# Patient Record
Sex: FEMALE | Race: WHITE | Employment: OTHER | URBAN - METROPOLITAN AREA
[De-identification: names, ages, dates, MRNs, and addresses within clinical notes are randomized per-mention and may not be internally consistent; named-entity substitution may affect disease eponyms.]

---

## 2017-03-22 ENCOUNTER — APPOINTMENT (OUTPATIENT)
Dept: CT IMAGING | Facility: HOSPITAL | Age: 67
End: 2017-03-22
Attending: EMERGENCY MEDICINE
Payer: MEDICARE

## 2017-03-22 ENCOUNTER — HOSPITAL ENCOUNTER (EMERGENCY)
Facility: HOSPITAL | Age: 67
Discharge: HOME | End: 2017-03-22
Attending: EMERGENCY MEDICINE
Payer: MEDICARE

## 2017-03-22 VITALS
RESPIRATION RATE: 16 BRPM | OXYGEN SATURATION: 96 % | DIASTOLIC BLOOD PRESSURE: 67 MMHG | HEART RATE: 74 BPM | WEIGHT: 195 LBS | TEMPERATURE: 98 F | SYSTOLIC BLOOD PRESSURE: 177 MMHG | BODY MASS INDEX: 35.88 KG/M2 | HEIGHT: 62 IN

## 2017-03-22 DIAGNOSIS — R73.9 HYPERGLYCEMIA: ICD-10-CM

## 2017-03-22 DIAGNOSIS — E86.0 DEHYDRATION: ICD-10-CM

## 2017-03-22 DIAGNOSIS — R19.7 DIARRHEA, UNSPECIFIED TYPE: Primary | ICD-10-CM

## 2017-03-22 LAB
ALBUMIN SERPL-MCNC: 3.8 G/DL (ref 3.5–4.8)
ALP LIVER SERPL-CCNC: 60 U/L (ref 55–142)
ALT SERPL-CCNC: 25 U/L (ref 14–54)
AST SERPL-CCNC: 19 U/L (ref 15–41)
BASOPHILS # BLD AUTO: 0.05 X10(3) UL (ref 0–0.1)
BASOPHILS NFR BLD AUTO: 0.4 %
BILIRUB SERPL-MCNC: 0.5 MG/DL (ref 0.1–2)
BILIRUB UR QL STRIP.AUTO: NEGATIVE
BUN BLD-MCNC: 26 MG/DL (ref 8–20)
CALCIUM BLD-MCNC: 9.5 MG/DL (ref 8.3–10.3)
CHLORIDE: 101 MMOL/L (ref 101–111)
CLARITY UR REFRACT.AUTO: CLEAR
CO2: 23 MMOL/L (ref 22–32)
COLOR UR AUTO: YELLOW
CREAT BLD-MCNC: 1.29 MG/DL (ref 0.55–1.02)
EOSINOPHIL # BLD AUTO: 0.33 X10(3) UL (ref 0–0.3)
EOSINOPHIL NFR BLD AUTO: 2.8 %
ERYTHROCYTE [DISTWIDTH] IN BLOOD BY AUTOMATED COUNT: 13.2 % (ref 11.5–16)
GLUCOSE BLD-MCNC: 255 MG/DL (ref 70–99)
GLUCOSE UR STRIP.AUTO-MCNC: 150 MG/DL
HCT VFR BLD AUTO: 36.6 % (ref 34–50)
HGB BLD-MCNC: 12.4 G/DL (ref 12–16)
IMMATURE GRANULOCYTE COUNT: 0.04 X10(3) UL (ref 0–1)
IMMATURE GRANULOCYTE RATIO %: 0.3 %
KETONES UR STRIP.AUTO-MCNC: NEGATIVE MG/DL
LEUKOCYTE ESTERASE UR QL STRIP.AUTO: NEGATIVE
LIPASE: 275 U/L (ref 73–393)
LYMPHOCYTES # BLD AUTO: 1.55 X10(3) UL (ref 0.9–4)
LYMPHOCYTES NFR BLD AUTO: 13 %
M PROTEIN MFR SERPL ELPH: 7.5 G/DL (ref 6.1–8.3)
MCH RBC QN AUTO: 29.3 PG (ref 27–33.2)
MCHC RBC AUTO-ENTMCNC: 33.9 G/DL (ref 31–37)
MCV RBC AUTO: 86.5 FL (ref 81–100)
MONOCYTES # BLD AUTO: 0.8 X10(3) UL (ref 0.1–0.6)
MONOCYTES NFR BLD AUTO: 6.7 %
NEUTROPHIL ABS PRELIM: 9.17 X10 (3) UL (ref 1.3–6.7)
NEUTROPHILS # BLD AUTO: 9.17 X10(3) UL (ref 1.3–6.7)
NEUTROPHILS NFR BLD AUTO: 76.8 %
NITRITE UR QL STRIP.AUTO: NEGATIVE
PH UR STRIP.AUTO: 5 [PH] (ref 4.5–8)
PLATELET # BLD AUTO: 226 10(3)UL (ref 150–450)
POTASSIUM SERPL-SCNC: 4.3 MMOL/L (ref 3.6–5.1)
PROT UR STRIP.AUTO-MCNC: 100 MG/DL
RBC # BLD AUTO: 4.23 X10(6)UL (ref 3.8–5.1)
RBC UR QL AUTO: NEGATIVE
RED CELL DISTRIBUTION WIDTH-SD: 41.4 FL (ref 35.1–46.3)
SODIUM SERPL-SCNC: 135 MMOL/L (ref 136–144)
SP GR UR STRIP.AUTO: 1.02 (ref 1–1.03)
UROBILINOGEN UR STRIP.AUTO-MCNC: <2 MG/DL
WBC # BLD AUTO: 11.9 X10(3) UL (ref 4–13)

## 2017-03-22 PROCEDURE — 87269 GIARDIA AG IF: CPT | Performed by: EMERGENCY MEDICINE

## 2017-03-22 PROCEDURE — 82272 OCCULT BLD FECES 1-3 TESTS: CPT | Performed by: EMERGENCY MEDICINE

## 2017-03-22 PROCEDURE — 87209 SMEAR COMPLEX STAIN: CPT | Performed by: EMERGENCY MEDICINE

## 2017-03-22 PROCEDURE — 87493 C DIFF AMPLIFIED PROBE: CPT | Performed by: EMERGENCY MEDICINE

## 2017-03-22 PROCEDURE — 96361 HYDRATE IV INFUSION ADD-ON: CPT

## 2017-03-22 PROCEDURE — 74176 CT ABD & PELVIS W/O CONTRAST: CPT

## 2017-03-22 PROCEDURE — 83690 ASSAY OF LIPASE: CPT | Performed by: EMERGENCY MEDICINE

## 2017-03-22 PROCEDURE — 80053 COMPREHEN METABOLIC PANEL: CPT | Performed by: EMERGENCY MEDICINE

## 2017-03-22 PROCEDURE — 87177 OVA AND PARASITES SMEARS: CPT | Performed by: EMERGENCY MEDICINE

## 2017-03-22 PROCEDURE — 99284 EMERGENCY DEPT VISIT MOD MDM: CPT

## 2017-03-22 PROCEDURE — 87427 SHIGA-LIKE TOXIN AG IA: CPT | Performed by: EMERGENCY MEDICINE

## 2017-03-22 PROCEDURE — 87086 URINE CULTURE/COLONY COUNT: CPT | Performed by: EMERGENCY MEDICINE

## 2017-03-22 PROCEDURE — 87425 ROTAVIRUS AG IA: CPT | Performed by: EMERGENCY MEDICINE

## 2017-03-22 PROCEDURE — 87046 STOOL CULTR AEROBIC BACT EA: CPT | Performed by: EMERGENCY MEDICINE

## 2017-03-22 PROCEDURE — 85025 COMPLETE CBC W/AUTO DIFF WBC: CPT | Performed by: EMERGENCY MEDICINE

## 2017-03-22 PROCEDURE — 87045 FECES CULTURE AEROBIC BACT: CPT | Performed by: EMERGENCY MEDICINE

## 2017-03-22 PROCEDURE — 81001 URINALYSIS AUTO W/SCOPE: CPT | Performed by: EMERGENCY MEDICINE

## 2017-03-22 PROCEDURE — 87015 SPECIMEN INFECT AGNT CONCNTJ: CPT | Performed by: EMERGENCY MEDICINE

## 2017-03-22 PROCEDURE — 96374 THER/PROPH/DIAG INJ IV PUSH: CPT

## 2017-03-22 RX ORDER — ONDANSETRON 2 MG/ML
4 INJECTION INTRAMUSCULAR; INTRAVENOUS ONCE
Status: COMPLETED | OUTPATIENT
Start: 2017-03-22 | End: 2017-03-22

## 2017-03-22 NOTE — ED INITIAL ASSESSMENT (HPI)
Pt with c/o diarrhea for past 3 days. Pt c/o nausea. Denies vomiting. Started Sunday night. Denies fevers.   Pt sent by pmd

## 2017-03-22 NOTE — ED PROVIDER NOTES
Patient Seen in: [facility] Emergency Department    History   Patient presents with:  Nausea/Vomiting/Diarrhea (gastrointestinal)    Stated Complaint: diarrhea    HPI    44-year-old female with history diabetes mellitus, hypertension, high cholester nightly. Choline Fenofibrate (FENOFIBRIC ACID) 135 MG Oral Capsule Delayed Release,  Take 1 capsule by mouth once daily. ezetimibe (ZETIA) 10 MG Oral Tab,  Take 1 tablet (10 mg total) by mouth once daily.    Lisinopril-Hydrochlorothiazide 20-12.5 MG Ora (36.7 °C) (Temporal)  Resp 17  Ht 157.5 cm (5' 2\")  Wt 88.451 kg  BMI 35.66 kg/m2  SpO2 96%        Physical Exam    GENERAL: Patient is awake, alert, well-appearing, in no acute distress. HEENT:  no scleral icterus.   Mucous membranes are slightly dry, or ---------                               -----------         ------                     CBC W/ DIFFERENTIAL[[id number]]          Abnormal            Final result                 Please view results for these tests on the individual orders.    ST Patient return if any change worsening symptoms. Mucous members are moist on reevaluation and patient clinically hydrated. Patient is well-appearing and was discharged good condition.       Disposition and Plan     Clinical Impression:  Diarrhea, unspecif

## 2017-03-23 ENCOUNTER — LAB ENCOUNTER (OUTPATIENT)
Dept: LAB | Facility: HOSPITAL | Age: 67
End: 2017-03-23
Attending: EMERGENCY MEDICINE
Payer: MEDICARE

## 2017-03-23 DIAGNOSIS — E86.0 DEHYDRATION: ICD-10-CM

## 2017-03-23 DIAGNOSIS — R19.7 DIARRHEA: Primary | ICD-10-CM

## 2017-03-23 PROCEDURE — 87046 STOOL CULTR AEROBIC BACT EA: CPT

## 2017-03-23 PROCEDURE — 87177 OVA AND PARASITES SMEARS: CPT

## 2017-03-23 PROCEDURE — 87015 SPECIMEN INFECT AGNT CONCNTJ: CPT

## 2017-03-23 PROCEDURE — 87045 FECES CULTURE AEROBIC BACT: CPT

## 2017-03-23 PROCEDURE — 87209 SMEAR COMPLEX STAIN: CPT

## 2017-03-23 PROCEDURE — 87269 GIARDIA AG IF: CPT

## 2017-03-23 PROCEDURE — 87427 SHIGA-LIKE TOXIN AG IA: CPT

## 2017-03-27 LAB
OVA AND PARASITE, TRICHROME STAIN: NEGATIVE
OVA AND PARASITE, WET MOUNT: NEGATIVE

## 2018-08-27 ENCOUNTER — APPOINTMENT (OUTPATIENT)
Dept: GENERAL RADIOLOGY | Facility: HOSPITAL | Age: 68
End: 2018-08-27
Payer: MEDICARE

## 2018-08-27 ENCOUNTER — APPOINTMENT (OUTPATIENT)
Dept: NUCLEAR MEDICINE | Facility: HOSPITAL | Age: 68
End: 2018-08-27
Attending: EMERGENCY MEDICINE
Payer: MEDICARE

## 2018-08-27 ENCOUNTER — HOSPITAL ENCOUNTER (OUTPATIENT)
Facility: HOSPITAL | Age: 68
Setting detail: OBSERVATION
Discharge: HOME | End: 2018-08-28
Attending: EMERGENCY MEDICINE | Admitting: HOSPITALIST
Payer: MEDICARE

## 2018-08-27 ENCOUNTER — APPOINTMENT (OUTPATIENT)
Dept: CT IMAGING | Facility: HOSPITAL | Age: 68
End: 2018-08-27
Attending: EMERGENCY MEDICINE
Payer: MEDICARE

## 2018-08-27 DIAGNOSIS — E87.6 HYPOKALEMIA: ICD-10-CM

## 2018-08-27 DIAGNOSIS — R07.9 CHEST PAIN OF UNCERTAIN ETIOLOGY: Primary | ICD-10-CM

## 2018-08-27 DIAGNOSIS — E11.9 TYPE 2 DIABETES MELLITUS WITHOUT COMPLICATION, WITH LONG-TERM CURRENT USE OF INSULIN (HCC): ICD-10-CM

## 2018-08-27 DIAGNOSIS — R77.8 ELEVATED TROPONIN: ICD-10-CM

## 2018-08-27 DIAGNOSIS — Z79.4 TYPE 2 DIABETES MELLITUS WITHOUT COMPLICATION, WITH LONG-TERM CURRENT USE OF INSULIN (HCC): ICD-10-CM

## 2018-08-27 LAB
ALBUMIN SERPL-MCNC: 3.6 G/DL (ref 3.5–4.8)
ALBUMIN/GLOB SERPL: 1.1 {RATIO} (ref 1–2)
ALP LIVER SERPL-CCNC: 59 U/L (ref 55–142)
ALT SERPL-CCNC: 32 U/L (ref 14–54)
ANION GAP SERPL CALC-SCNC: 10 MMOL/L (ref 0–18)
AST SERPL-CCNC: 22 U/L (ref 15–41)
ATRIAL RATE: 65 BPM
BASOPHILS # BLD AUTO: 0.06 X10(3) UL (ref 0–0.1)
BASOPHILS NFR BLD AUTO: 0.6 %
BILIRUB SERPL-MCNC: 0.3 MG/DL (ref 0.1–2)
BUN BLD-MCNC: 18 MG/DL (ref 8–20)
BUN/CREAT SERPL: 15.8 (ref 10–20)
CALCIUM BLD-MCNC: 10.4 MG/DL (ref 8.3–10.3)
CHLORIDE SERPL-SCNC: 103 MMOL/L (ref 101–111)
CO2 SERPL-SCNC: 27 MMOL/L (ref 22–32)
CREAT BLD-MCNC: 1.14 MG/DL (ref 0.55–1.02)
D-DIMER: 0.82 UG/ML FEU (ref 0–0.49)
EOSINOPHIL # BLD AUTO: 0.42 X10(3) UL (ref 0–0.3)
EOSINOPHIL NFR BLD AUTO: 4.5 %
ERYTHROCYTE [DISTWIDTH] IN BLOOD BY AUTOMATED COUNT: 12.7 % (ref 11.5–16)
GLOBULIN PLAS-MCNC: 3.4 G/DL (ref 2.5–4)
GLUCOSE BLD-MCNC: 156 MG/DL (ref 70–99)
GLUCOSE BLD-MCNC: 79 MG/DL (ref 65–99)
GLUCOSE BLD-MCNC: 90 MG/DL (ref 65–99)
HCT VFR BLD AUTO: 35.8 % (ref 34–50)
HGB BLD-MCNC: 12 G/DL (ref 12–16)
IMMATURE GRANULOCYTE COUNT: 0.03 X10(3) UL (ref 0–1)
IMMATURE GRANULOCYTE RATIO %: 0.3 %
LIPASE: 348 U/L (ref 73–393)
LYMPHOCYTES # BLD AUTO: 2.73 X10(3) UL (ref 0.9–4)
LYMPHOCYTES NFR BLD AUTO: 29 %
M PROTEIN MFR SERPL ELPH: 7 G/DL (ref 6.1–8.3)
MCH RBC QN AUTO: 28.4 PG (ref 27–33.2)
MCHC RBC AUTO-ENTMCNC: 33.5 G/DL (ref 31–37)
MCV RBC AUTO: 84.6 FL (ref 81–100)
MONOCYTES # BLD AUTO: 0.62 X10(3) UL (ref 0.1–1)
MONOCYTES NFR BLD AUTO: 6.6 %
NEUTROPHIL ABS PRELIM: 5.57 X10 (3) UL (ref 1.3–6.7)
NEUTROPHILS # BLD AUTO: 5.57 X10(3) UL (ref 1.3–6.7)
NEUTROPHILS NFR BLD AUTO: 59 %
OSMOLALITY SERPL CALC.SUM OF ELEC: 295 MOSM/KG (ref 275–295)
P AXIS: 37 DEGREES
P-R INTERVAL: 172 MS
PLATELET # BLD AUTO: 245 10(3)UL (ref 150–450)
POTASSIUM SERPL-SCNC: 3.3 MMOL/L (ref 3.6–5.1)
Q-T INTERVAL: 420 MS
QRS DURATION: 90 MS
QTC CALCULATION (BEZET): 436 MS
R AXIS: 27 DEGREES
RBC # BLD AUTO: 4.23 X10(6)UL (ref 3.8–5.1)
RED CELL DISTRIBUTION WIDTH-SD: 39.3 FL (ref 35.1–46.3)
SED RATE-ML: 13 MM/HR (ref 0–25)
SODIUM SERPL-SCNC: 140 MMOL/L (ref 136–144)
T AXIS: 140 DEGREES
TROPONIN I SERPL-MCNC: 0.05 NG/ML (ref ?–0.05)
VENTRICULAR RATE: 65 BPM
WBC # BLD AUTO: 9.4 X10(3) UL (ref 4–13)

## 2018-08-27 PROCEDURE — 71045 X-RAY EXAM CHEST 1 VIEW: CPT | Performed by: EMERGENCY MEDICINE

## 2018-08-27 PROCEDURE — 99220 INITIAL OBSERVATION CARE,LEVL III: CPT | Performed by: HOSPITALIST

## 2018-08-27 PROCEDURE — 74176 CT ABD & PELVIS W/O CONTRAST: CPT | Performed by: EMERGENCY MEDICINE

## 2018-08-27 PROCEDURE — 78582 LUNG VENTILAT&PERFUS IMAGING: CPT | Performed by: EMERGENCY MEDICINE

## 2018-08-27 RX ORDER — MORPHINE SULFATE 4 MG/ML
4 INJECTION, SOLUTION INTRAMUSCULAR; INTRAVENOUS EVERY 30 MIN PRN
Status: DISCONTINUED | OUTPATIENT
Start: 2018-08-27 | End: 2018-08-27

## 2018-08-27 RX ORDER — ASPIRIN 81 MG/1
324 TABLET, CHEWABLE ORAL ONCE
Status: COMPLETED | OUTPATIENT
Start: 2018-08-27 | End: 2018-08-27

## 2018-08-27 RX ORDER — DEXTROSE MONOHYDRATE 25 G/50ML
50 INJECTION, SOLUTION INTRAVENOUS
Status: DISCONTINUED | OUTPATIENT
Start: 2018-08-27 | End: 2018-08-28

## 2018-08-27 RX ORDER — ACETAMINOPHEN 325 MG/1
650 TABLET ORAL EVERY 6 HOURS PRN
Status: DISCONTINUED | OUTPATIENT
Start: 2018-08-27 | End: 2018-08-28

## 2018-08-27 RX ORDER — ENOXAPARIN SODIUM 100 MG/ML
40 INJECTION SUBCUTANEOUS NIGHTLY
Status: DISCONTINUED | OUTPATIENT
Start: 2018-08-27 | End: 2018-08-28

## 2018-08-27 RX ORDER — MULTIVIT WITH MINERALS/LUTEIN
1000 TABLET ORAL DAILY
COMMUNITY
End: 2020-09-17

## 2018-08-27 RX ORDER — ZOLPIDEM TARTRATE 5 MG/1
5 TABLET ORAL NIGHTLY PRN
Status: DISCONTINUED | OUTPATIENT
Start: 2018-08-27 | End: 2018-08-28

## 2018-08-27 RX ORDER — ESCITALOPRAM OXALATE 20 MG/1
30 TABLET ORAL EVERY EVENING
COMMUNITY
End: 2019-01-15

## 2018-08-27 RX ORDER — ASPIRIN 81 MG/1
324 TABLET, CHEWABLE ORAL DAILY
Status: DISCONTINUED | OUTPATIENT
Start: 2018-08-28 | End: 2018-08-28

## 2018-08-27 RX ORDER — TRAZODONE HYDROCHLORIDE 50 MG/1
50 TABLET ORAL NIGHTLY PRN
Status: DISCONTINUED | OUTPATIENT
Start: 2018-08-27 | End: 2018-08-28

## 2018-08-27 RX ORDER — ATORVASTATIN CALCIUM 80 MG/1
80 TABLET, FILM COATED ORAL NIGHTLY
Status: DISCONTINUED | OUTPATIENT
Start: 2018-08-27 | End: 2018-08-28

## 2018-08-27 RX ORDER — EZETIMIBE 10 MG/1
10 TABLET ORAL NIGHTLY
COMMUNITY
End: 2019-07-16

## 2018-08-27 RX ORDER — ASPIRIN 81 MG/1
81 TABLET ORAL DAILY
Status: ON HOLD | COMMUNITY
End: 2020-02-19

## 2018-08-27 RX ORDER — PANTOPRAZOLE SODIUM 40 MG/1
40 TABLET, DELAYED RELEASE ORAL
Status: DISCONTINUED | OUTPATIENT
Start: 2018-08-28 | End: 2018-08-28

## 2018-08-27 RX ORDER — POTASSIUM CHLORIDE 14.9 MG/ML
20 INJECTION INTRAVENOUS ONCE
Status: COMPLETED | OUTPATIENT
Start: 2018-08-27 | End: 2018-08-27

## 2018-08-27 RX ORDER — LISINOPRIL AND HYDROCHLOROTHIAZIDE 20; 12.5 MG/1; MG/1
2 TABLET ORAL
Status: DISCONTINUED | OUTPATIENT
Start: 2018-08-28 | End: 2018-08-27 | Stop reason: SDUPTHER

## 2018-08-27 RX ORDER — POTASSIUM CHLORIDE 20 MEQ/1
20 TABLET, EXTENDED RELEASE ORAL ONCE
Status: COMPLETED | OUTPATIENT
Start: 2018-08-27 | End: 2018-08-27

## 2018-08-27 RX ORDER — FAMOTIDINE 10 MG/ML
20 INJECTION, SOLUTION INTRAVENOUS ONCE
Status: COMPLETED | OUTPATIENT
Start: 2018-08-27 | End: 2018-08-27

## 2018-08-27 RX ORDER — FENOFIBRATE 145 MG/1
145 TABLET, COATED ORAL DAILY
COMMUNITY
End: 2018-11-05

## 2018-08-27 RX ORDER — LEVOTHYROXINE SODIUM 0.1 MG/1
100 TABLET ORAL
COMMUNITY

## 2018-08-27 RX ORDER — ONDANSETRON 2 MG/ML
4 INJECTION INTRAMUSCULAR; INTRAVENOUS EVERY 6 HOURS PRN
Status: DISCONTINUED | OUTPATIENT
Start: 2018-08-27 | End: 2018-08-28

## 2018-08-27 RX ORDER — MULTIVITAMIN WITH FOLIC ACID 400 MCG
1 TABLET ORAL DAILY
COMMUNITY
End: 2020-09-17

## 2018-08-27 RX ORDER — LEVOTHYROXINE SODIUM 0.07 MG/1
175 TABLET ORAL
COMMUNITY

## 2018-08-27 RX ORDER — ENOXAPARIN SODIUM 100 MG/ML
40 INJECTION SUBCUTANEOUS EVERY EVENING
Status: DISCONTINUED | OUTPATIENT
Start: 2018-08-27 | End: 2018-08-27

## 2018-08-27 RX ORDER — METOCLOPRAMIDE HYDROCHLORIDE 5 MG/ML
5 INJECTION INTRAMUSCULAR; INTRAVENOUS EVERY 8 HOURS PRN
Status: DISCONTINUED | OUTPATIENT
Start: 2018-08-27 | End: 2018-08-28

## 2018-08-27 NOTE — PROGRESS NOTES
[facility] Pharmacy Note:  Renal Dose Adjustment for Metoclopramide (REGLAN)    Mike Cameron has been prescribed metoclopramide (REGLAN) 10 mg q8hr prn. Estimated Creatinine Clearance: 39.1 mL/min (A) (based on SCr of 1.14 mg/dL (H)).     Her calculated cre

## 2018-08-27 NOTE — ED PROVIDER NOTES
Patient Seen in: [facility] Emergency Department    History   Patient presents with:  Chest Pain Angina (cardiovascular)    Stated Complaint: chest pain    HPI    Patient complains of lower chest and epigastric pain.   Patient had an episode 2 weeks negative except as noted above.     Physical Exam   ED Triage Vitals  BP: (!) 182/67 [08/27/18 1110]  Pulse: 67 [08/27/18 1110]  Resp: 18 [08/27/18 1110]  Temp: 98 °F (36.7 °C) [08/27/18 1110]  Temp src: n/a  SpO2: 98 % [08/27/18 1110]  O2 Device: None [facility] Narrative:     FEU = Fibrinogen Equivalent Units.     D-Dimer results of less than 0.5 ug/mL (FEU) have been shown to contribute to the exclusion of venous thromboembolism with a negative predictive value of approximately 95% when results are used as part o elevated 0.050.   Repeat troponin pending  CBC shows normal white count, hemoglobin, and platelets  D-dimer elevated prompting VQ scan    Chest x-ray  CONCLUSION:  The heart size and vascularity are normal.  The lungs are clear.  No infiltrate effusion or m

## 2018-08-27 NOTE — H&P
[facility]  History and Physical     [facility] Patient Status:  Emergency    12/15/1950 MRN [id number]   Location [address] [facility] Attending No att. providers found   Hosp Day # 0 PCP Manpreet [id number]     Chief Complaint: Outpatient Prescriptions on File Prior to Encounter:  Insulin Degludec (TRESIBA FLEXTOUCH) 100 UNIT/ML Subcutaneous Solution Pen-injector Inject 60 Units into the skin 2 (two) times daily.  Disp:  Rfl:    atorvastatin 80 MG Oral Tab Take 1 tablet (80 mg tot Psychiatric: Appropriate mood and affect.       Diagnostic Data:      Labs:  Recent Labs   Lab  08/27/18   1104   WBC  9.4   HGB  12.0   MCV  84.6   PLT  245.0       Recent Labs   Lab  08/27/18   1105   GLU  156*   BUN  18   CREATSERUM  1.14*   GFRAA  57*

## 2018-08-27 NOTE — CONSULTS
[facility]  Report of Consultation    Saadia Ezequiel Patient Status:  Emergency    12/15/1950 MRN [id number]   Location [address] [facility] [address] Attending No att. providers found   Hosp Day # 0 PCP Brisas 2243     Reason for Consultat There are no exacerbating or relieving factors and the sx do not radiate. There is no change in quality or severity with movement/rotation and she is not SOB. She has felt clammy since and with some nausea.   Her stools have been loose lately and she admi SWELLING  Gnp Iodine                Lactase                 RASH  Shellfish               TONGUE SWELLING  Welchol [Colesevela*    NAUSEA ONLY    Medications:    Current Facility-Administered Medications:   •  morphINE sulfate (PF) 4 MG/ML injection 4 mg, 1.14*   CA 10.4*       Recent Labs   08/27/18  1105   ALT 32   AST 22   ALB 3.6         Recent Labs   08/27/18  1105 08/27/18  1513   TROP 0.050* 0.050*     No results found for: PT, INR        Tomy Ocasio MD  8/27/2018  3:53 PM

## 2018-08-27 NOTE — ED INITIAL ASSESSMENT (HPI)
For the past month she has not been feeling good  But this past week has been hard for her  Hard time sleeping nauseated weak.

## 2018-08-27 NOTE — PROGRESS NOTES
Pt arrived from ED around 1700. Pt denies chest pain/discomfort, SOB. RA. Potassium IV infusing. All questions answered. Pt verbalized understanding. Needs met. GI on consult. Dr. Sousa Able notified. No new orders.

## 2018-08-27 NOTE — PROGRESS NOTES
NURSING ADMISSION NOTE      Admission navigator completed. Patient alert and oriented x4. Complains of mild left sided chest pain that radiates to the sternum. No family at bedside. Patient states that she is a DNR. Report given to RN.  Patient going to

## 2018-08-28 ENCOUNTER — APPOINTMENT (OUTPATIENT)
Dept: CV DIAGNOSTICS | Facility: HOSPITAL | Age: 68
End: 2018-08-28
Attending: INTERNAL MEDICINE
Payer: MEDICARE

## 2018-08-28 ENCOUNTER — APPOINTMENT (OUTPATIENT)
Dept: CV DIAGNOSTICS | Facility: HOSPITAL | Age: 68
End: 2018-08-28
Attending: HOSPITALIST
Payer: MEDICARE

## 2018-08-28 VITALS
SYSTOLIC BLOOD PRESSURE: 111 MMHG | TEMPERATURE: 98 F | WEIGHT: 204.81 LBS | HEIGHT: 63 IN | HEART RATE: 61 BPM | OXYGEN SATURATION: 96 % | DIASTOLIC BLOOD PRESSURE: 61 MMHG | BODY MASS INDEX: 36.29 KG/M2 | RESPIRATION RATE: 20 BRPM

## 2018-08-28 LAB
ANION GAP SERPL CALC-SCNC: 6 MMOL/L (ref 0–18)
ATRIAL RATE: 57 BPM
ATRIAL RATE: 61 BPM
BUN BLD-MCNC: 18 MG/DL (ref 8–20)
BUN/CREAT SERPL: 16.1 (ref 10–20)
CALCIUM BLD-MCNC: 10.1 MG/DL (ref 8.3–10.3)
CHLORIDE SERPL-SCNC: 108 MMOL/L (ref 101–111)
CO2 SERPL-SCNC: 29 MMOL/L (ref 22–32)
CREAT BLD-MCNC: 1.12 MG/DL (ref 0.55–1.02)
GLUCOSE BLD-MCNC: 115 MG/DL (ref 65–99)
GLUCOSE BLD-MCNC: 161 MG/DL (ref 65–99)
GLUCOSE BLD-MCNC: 164 MG/DL (ref 65–99)
GLUCOSE BLD-MCNC: 63 MG/DL (ref 70–99)
GLUCOSE BLD-MCNC: 67 MG/DL (ref 65–99)
GLUCOSE BLD-MCNC: 80 MG/DL (ref 65–99)
OSMOLALITY SERPL CALC.SUM OF ELEC: 296 MOSM/KG (ref 275–295)
P AXIS: 25 DEGREES
P AXIS: 27 DEGREES
P-R INTERVAL: 178 MS
P-R INTERVAL: 184 MS
POTASSIUM SERPL-SCNC: 4.4 MMOL/L (ref 3.6–5.1)
Q-T INTERVAL: 444 MS
Q-T INTERVAL: 444 MS
QRS DURATION: 82 MS
QRS DURATION: 82 MS
QTC CALCULATION (BEZET): 432 MS
QTC CALCULATION (BEZET): 446 MS
R AXIS: 10 DEGREES
R AXIS: 17 DEGREES
SODIUM SERPL-SCNC: 143 MMOL/L (ref 136–144)
T AXIS: 124 DEGREES
T AXIS: 139 DEGREES
VENTRICULAR RATE: 57 BPM
VENTRICULAR RATE: 61 BPM

## 2018-08-28 PROCEDURE — 93306 TTE W/DOPPLER COMPLETE: CPT | Performed by: INTERNAL MEDICINE

## 2018-08-28 PROCEDURE — 99217 OBSERVATION CARE DISCHARGE: CPT | Performed by: HOSPITALIST

## 2018-08-28 RX ORDER — PANTOPRAZOLE SODIUM 40 MG/1
40 TABLET, DELAYED RELEASE ORAL
Qty: 60 TABLET | Refills: 2 | Status: SHIPPED | OUTPATIENT
Start: 2018-08-28 | End: 2019-11-13 | Stop reason: ALTCHOICE

## 2018-08-28 RX ORDER — PANTOPRAZOLE SODIUM 40 MG/1
40 TABLET, DELAYED RELEASE ORAL
Qty: 30 TABLET | Refills: 2 | Status: SHIPPED | OUTPATIENT
Start: 2018-08-29 | End: 2018-08-28

## 2018-08-28 NOTE — CONSULTS
[facility]                       Gastroenterology [address] [facility] Gastroenterology    Levell Mayo Grayson Patient Status:  Observation    12/15/1950 MRN [id number]   [facility] 2NE-A Attending Misty Harrell MD   [facility] Day # 0 elevated ×3 0.047.   She underwent a 2D echo with plans for a stress test.  She currently reports ongoing pain, better since admission, and is tolerating a diet     PMHx:   Past Medical History:   Diagnosis Date   • Bipolar affective (Cristiane Graff)    • Diabetes (HC Oral QPM   Insulin Aspart Pen (NOVOLOG) 100 UNIT/ML flexpen 1-68 Units 1-68 Units Subcutaneous TID CC   [COMPLETED] potassium chloride IVPB premix 20 mEq 20 mEq Intravenous Once   Pantoprazole Sodium (PROTONIX) EC tab 40 mg 40 mg Oral QAM AC   lisinopril ( reports no history of prior solid tumor or hematologic malignancy           ENT: The patient reports no hoarseness of voice, hearing loss, sinus congestion, tinnitus           Neurologic: The patient reports no history of seizure, stroke, or frequent heada eval for PE     TECHNIQUE:  The patient was ventilated with 42.0 mCi Tc-99m DTPA in the nebulizer, and supine images of the lungs were obtained in the 6 standard projections.   This was followed by IV injection of 5.1 mCi Tc-99m MAA, and similar projection without ectasia or aneurysm. RETROPERITONEUM:  No mass or adenopathy. BOWEL/MESENTERY:  Stomach, duodenum sweep and small bowel are unremarkable. Normal appendix.   There are extensive diverticular changes of the colon most pronounced along the sigmoid and will attempt to arrange following stress test     Thank you for the consultation, we will follow the patient with you.     JESSICA Morejon  12:38 PM  8/28/2018  [facility]astroenterology  [phone]    GI attending addendum:    I have personally s

## 2018-08-28 NOTE — PLAN OF CARE
NURSING DISCHARGE NOTE    Discharged Home via Wheelchair. Accompanied by Support staff  Belongings Taken by patient/family     D/c instructions given to pt, verbalized understanding. Ana Maria Dumont

## 2018-08-28 NOTE — PROGRESS NOTES
[facility]  Cardiology Progress Note    Patricia Randhawa Patient Status:  Observation    12/15/1950 MRN [id number]   [facility] 2NE-A Attending Lindsay Mahmood MD   Hosp Day # 0 PCP EDUARDA DURAN     Assessment:  Epigastric pain and ten 1000 : 207 lb 6.4 oz (94.1 kg)      Physical Exam:  General:  no apparent distress.   Cardiac:   RRR, normal S1 S2,  no murmurs/gallops  Lungs:  Clear to auscultation bilaterally, good chest wall expansion  Abdomen: Soft, obese, +tender epigastrum to palpat

## 2018-08-28 NOTE — PLAN OF CARE
C/o epigastric pain off and on and c/o nausea tis am  Denies c/p, no sob  Seen by GI , ok d/c today  Dr Mechelle Simons ok d/c

## 2018-08-28 NOTE — PROGRESS NOTES
[facility]  Progress note     Jonathan Benitez Patient Status:  Emergency    12/15/1950 MRN [id number]   Location [address] [facility] Attending No att. providers found   Hosp Day # 0 PCP Manpreet 1187     Chief Complaint: chest risk factors, trop elevation, and ekg changes (ST dep laterally); possible PUD also. Continue ppi; await gi and cards recs  2. DMII-insulin ordered-monitor sugars  3. HTN    Possible d/c later if cleared by cards and gi.   Waiting to see if she needs egd a

## 2018-08-28 NOTE — PLAN OF CARE
Diabetes/Glucose Control    • Glucose maintained within prescribed range Progressing        Patient/Family Goals    • Patient/Family Long Term Goal Progressing    • Patient/Family Short Term Goal Progressing          Neuro: a&ox4  Resp: clear, room air  Ca

## 2018-08-28 NOTE — PLAN OF CARE
The patient may proceed to EGD with low cardiac risk and does not need stress test prior to EGD. This was d/w ROSENDO Pepper who will arrange EGD with Dr Shelbie Young next wk.

## 2018-08-29 NOTE — DISCHARGE SUMMARY
[facility]  DISCHARGE SUMMARY     Artemio Coffman Patient Status:  Observation    12/15/1950 MRN [id number]   [facility] 2NE-A Attending No att. providers found   Hosp Day # 0 PCP Felicitas Osler     Date of Admission: 2018  Date medications      Instructions Prescription details   Pantoprazole Sodium 40 MG Tbec  Commonly known as:  PROTONIX      Take 1 tablet (40 mg total) by mouth 2 (two) times daily before meals.    Quantity:  60 tablet  Refills:  2        CONTINUE taking these m drug:  Insulin Aspart Pen      Inject 40 Units into the skin 3 (three) times daily before meals. Refills:  0     TAB-A-HANNAH Tabs      Take 1 tablet by mouth daily.    Refills:  0     TRESIBA FLEXTOUCH 100 UNIT/ML Sopn  Generic drug:  Insulin Degludec

## 2019-09-18 PROBLEM — R00.0 TACHYCARDIA: Status: ACTIVE | Noted: 2019-09-18

## 2019-09-18 PROBLEM — Z95.1 S/P CABG X 3: Status: ACTIVE | Noted: 2019-09-18

## 2020-01-23 ENCOUNTER — APPOINTMENT (OUTPATIENT)
Dept: CT IMAGING | Facility: HOSPITAL | Age: 70
End: 2020-01-23
Attending: NURSE PRACTITIONER
Payer: MEDICARE

## 2020-01-23 ENCOUNTER — HOSPITAL ENCOUNTER (EMERGENCY)
Facility: HOSPITAL | Age: 70
Discharge: HOME | End: 2020-01-23
Attending: EMERGENCY MEDICINE
Payer: MEDICARE

## 2020-01-23 VITALS
HEART RATE: 74 BPM | DIASTOLIC BLOOD PRESSURE: 46 MMHG | HEIGHT: 62 IN | WEIGHT: 194 LBS | TEMPERATURE: 99 F | OXYGEN SATURATION: 97 % | BODY MASS INDEX: 35.7 KG/M2 | RESPIRATION RATE: 18 BRPM | SYSTOLIC BLOOD PRESSURE: 138 MMHG

## 2020-01-23 DIAGNOSIS — K57.92 ACUTE DIVERTICULITIS: Primary | ICD-10-CM

## 2020-01-23 LAB
ALBUMIN SERPL-MCNC: 3.5 G/DL (ref 3.4–5)
ALBUMIN/GLOB SERPL: 0.9 {RATIO} (ref 1–2)
ALP LIVER SERPL-CCNC: 73 U/L (ref 55–142)
ALT SERPL-CCNC: 42 U/L (ref 13–56)
ANION GAP SERPL CALC-SCNC: 7 MMOL/L (ref 0–18)
AST SERPL-CCNC: 45 U/L (ref 15–37)
BASOPHILS # BLD AUTO: 0.06 X10(3) UL (ref 0–0.2)
BASOPHILS NFR BLD AUTO: 0.4 %
BILIRUB SERPL-MCNC: 0.4 MG/DL (ref 0.1–2)
BILIRUB UR QL STRIP.AUTO: NEGATIVE
BUN BLD-MCNC: 32 MG/DL (ref 7–18)
BUN/CREAT SERPL: 23.4 (ref 10–20)
CALCIUM BLD-MCNC: 9.6 MG/DL (ref 8.5–10.1)
CHLORIDE SERPL-SCNC: 105 MMOL/L (ref 98–112)
CLARITY UR REFRACT.AUTO: CLEAR
CO2 SERPL-SCNC: 25 MMOL/L (ref 21–32)
CREAT BLD-MCNC: 1.37 MG/DL (ref 0.55–1.02)
DEPRECATED RDW RBC AUTO: 43.5 FL (ref 35.1–46.3)
EOSINOPHIL # BLD AUTO: 0.21 X10(3) UL (ref 0–0.7)
EOSINOPHIL NFR BLD AUTO: 1.5 %
ERYTHROCYTE [DISTWIDTH] IN BLOOD BY AUTOMATED COUNT: 14.6 % (ref 11–15)
GLOBULIN PLAS-MCNC: 3.8 G/DL (ref 2.8–4.4)
GLUCOSE BLD-MCNC: 130 MG/DL (ref 70–99)
GLUCOSE BLD-MCNC: 131 MG/DL (ref 70–99)
GLUCOSE UR STRIP.AUTO-MCNC: NEGATIVE MG/DL
HCT VFR BLD AUTO: 33.1 % (ref 35–48)
HGB BLD-MCNC: 10.6 G/DL (ref 12–16)
IMM GRANULOCYTES # BLD AUTO: 0.05 X10(3) UL (ref 0–1)
IMM GRANULOCYTES NFR BLD: 0.3 %
KETONES UR STRIP.AUTO-MCNC: NEGATIVE MG/DL
LEUKOCYTE ESTERASE UR QL STRIP.AUTO: NEGATIVE
LIPASE SERPL-CCNC: 298 U/L (ref 73–393)
LYMPHOCYTES # BLD AUTO: 1.62 X10(3) UL (ref 1–4)
LYMPHOCYTES NFR BLD AUTO: 11.2 %
M PROTEIN MFR SERPL ELPH: 7.3 G/DL (ref 6.4–8.2)
MCH RBC QN AUTO: 26.6 PG (ref 26–34)
MCHC RBC AUTO-ENTMCNC: 32 G/DL (ref 31–37)
MCV RBC AUTO: 83 FL (ref 80–100)
MONOCYTES # BLD AUTO: 1.16 X10(3) UL (ref 0.1–1)
MONOCYTES NFR BLD AUTO: 8 %
NEUTROPHILS # BLD AUTO: 11.35 X10 (3) UL (ref 1.5–7.7)
NEUTROPHILS # BLD AUTO: 11.35 X10(3) UL (ref 1.5–7.7)
NEUTROPHILS NFR BLD AUTO: 78.6 %
NITRITE UR QL STRIP.AUTO: NEGATIVE
OSMOLALITY SERPL CALC.SUM OF ELEC: 293 MOSM/KG (ref 275–295)
PH UR STRIP.AUTO: 6 [PH] (ref 4.5–8)
PLATELET # BLD AUTO: 252 10(3)UL (ref 150–450)
POTASSIUM SERPL-SCNC: 3.8 MMOL/L (ref 3.5–5.1)
PROT UR STRIP.AUTO-MCNC: 100 MG/DL
RBC # BLD AUTO: 3.99 X10(6)UL (ref 3.8–5.3)
RBC UR QL AUTO: NEGATIVE
SODIUM SERPL-SCNC: 137 MMOL/L (ref 136–145)
SP GR UR STRIP.AUTO: 1.01 (ref 1–1.03)
UROBILINOGEN UR STRIP.AUTO-MCNC: <2 MG/DL
WBC # BLD AUTO: 14.5 X10(3) UL (ref 4–11)

## 2020-01-23 PROCEDURE — 82962 GLUCOSE BLOOD TEST: CPT

## 2020-01-23 PROCEDURE — 96361 HYDRATE IV INFUSION ADD-ON: CPT

## 2020-01-23 PROCEDURE — 96365 THER/PROPH/DIAG IV INF INIT: CPT

## 2020-01-23 PROCEDURE — 83690 ASSAY OF LIPASE: CPT | Performed by: EMERGENCY MEDICINE

## 2020-01-23 PROCEDURE — 83690 ASSAY OF LIPASE: CPT

## 2020-01-23 PROCEDURE — 81001 URINALYSIS AUTO W/SCOPE: CPT

## 2020-01-23 PROCEDURE — 85025 COMPLETE CBC W/AUTO DIFF WBC: CPT

## 2020-01-23 PROCEDURE — 99284 EMERGENCY DEPT VISIT MOD MDM: CPT

## 2020-01-23 PROCEDURE — 80053 COMPREHEN METABOLIC PANEL: CPT

## 2020-01-23 PROCEDURE — 96366 THER/PROPH/DIAG IV INF ADDON: CPT

## 2020-01-23 PROCEDURE — 74176 CT ABD & PELVIS W/O CONTRAST: CPT | Performed by: NURSE PRACTITIONER

## 2020-01-23 PROCEDURE — 81001 URINALYSIS AUTO W/SCOPE: CPT | Performed by: EMERGENCY MEDICINE

## 2020-01-23 PROCEDURE — 85025 COMPLETE CBC W/AUTO DIFF WBC: CPT | Performed by: EMERGENCY MEDICINE

## 2020-01-23 PROCEDURE — 80053 COMPREHEN METABOLIC PANEL: CPT | Performed by: EMERGENCY MEDICINE

## 2020-01-23 PROCEDURE — 99285 EMERGENCY DEPT VISIT HI MDM: CPT

## 2020-01-23 RX ORDER — LEVOFLOXACIN 5 MG/ML
750 INJECTION, SOLUTION INTRAVENOUS ONCE
Status: COMPLETED | OUTPATIENT
Start: 2020-01-23 | End: 2020-01-23

## 2020-01-23 RX ORDER — KETOROLAC TROMETHAMINE 30 MG/ML
15 INJECTION, SOLUTION INTRAMUSCULAR; INTRAVENOUS ONCE
Status: DISCONTINUED | OUTPATIENT
Start: 2020-01-23 | End: 2020-01-23

## 2020-01-23 RX ORDER — ONDANSETRON 2 MG/ML
4 INJECTION INTRAMUSCULAR; INTRAVENOUS ONCE
Status: DISCONTINUED | OUTPATIENT
Start: 2020-01-23 | End: 2020-01-23

## 2020-01-23 RX ORDER — SODIUM CHLORIDE 9 MG/ML
INJECTION, SOLUTION INTRAVENOUS CONTINUOUS
Status: DISCONTINUED | OUTPATIENT
Start: 2020-01-23 | End: 2020-01-23

## 2020-01-23 RX ORDER — METRONIDAZOLE 500 MG/1
500 TABLET ORAL ONCE
Status: COMPLETED | OUTPATIENT
Start: 2020-01-23 | End: 2020-01-23

## 2020-01-23 RX ORDER — LEVOFLOXACIN 750 MG/1
750 TABLET ORAL DAILY
Qty: 10 TABLET | Refills: 0 | Status: SHIPPED | OUTPATIENT
Start: 2020-01-23 | End: 2020-02-02

## 2020-01-23 RX ORDER — METRONIDAZOLE 500 MG/1
500 TABLET ORAL 3 TIMES DAILY
Qty: 30 TABLET | Refills: 0 | Status: SHIPPED | OUTPATIENT
Start: 2020-01-23 | End: 2020-02-02

## 2020-01-23 NOTE — PROGRESS NOTES
[facility] Pharmacy Note:  Dose Adjustment for levofloxacin (Omaira Cavazos)    Sagar Huynh is a 78year old female who has been prescribed levofloxacin (LEVAQUIN) 500 mg IVPB once.   CrCl is estimated creatinine clearance is 30.2 mL/min (A) (based on SCr of 1.37 m

## 2020-01-23 NOTE — ED NOTES
Pt reports lower abdominal pain and states pain is also across the upper part of her abdomen. States that pain started yesterday. Reports she had a bowel movement today and that it was very hard. Denies any urinary symptoms.  States that pain is worse when

## 2020-01-23 NOTE — ED PROVIDER NOTES
Patient Seen in: [facility] Emergency Department      History   Patient presents with:  Abdomen/Flank Pain    Stated Complaint: abd pain    HPI    79-year-old white female who presents emerged from today frequent of abdominal pain.   Patient that she above.    Physical Exam     ED Triage Vitals   BP 01/23/20 1248 151/68   Pulse 01/23/20 1248 70   Resp 01/23/20 1248 18   Temp 01/23/20 1248 98.9 °F (37.2 °C)   Temp src 01/23/20 1248 Temporal   SpO2 01/23/20 1248 97 %   O2 Device 01/23/20 1343 None (Room Notable for the following components:    WBC 14.5 (*)     HGB 10.6 (*)     HCT 33.1 (*)     Neutrophil Absolute Prelim 11.35 (*)     Neutrophil Absolute 11.35 (*)     Monocyte Absolute 1.16 (*)     All other components within normal limits   ANDI Lester PELVIC NODES:  No adenopathy. PELVIC ORGANS:  No visible mass. Pelvic organs appropriate for patient age. BONES:  No bony lesion or fracture. LUNG BASES:  No visible pulmonary or pleural disease.       =====  CONCLUSION:    1.  Acute diverticul

## 2020-01-23 NOTE — ED PROVIDER NOTES
Patient Seen in: [facility] Emergency Department      History   Patient presents with:  Abdomen/Flank Pain    Stated Complaint: abd pain    HPI  79 yo female with history of hysterectomy and diverticulitis presents with lower abdominal pain since ye 1248 70   Resp 01/23/20 1248 18   Temp 01/23/20 1248 98.9 °F (37.2 °C)   Temp src 01/23/20 1248 Temporal   SpO2 01/23/20 1248 97 %   O2 Device 01/23/20 1343 None (Room air)       Current:/46   Pulse 74   Temp 98.9 °F (37.2 °C) (Temporal)   Resp 18 Creatinine 1.37 (*)     BUN/CREA Ratio 23.4 (*)     GFR, Non- 39 (*)     GFR, -American 45 (*)     AST 45 (*)     A/G Ratio 0.9 (*)     All other components within normal limits   URINALYSIS WITH CULTURE REFLEX - Abnormal; Notable fo Technologist)  complains of lower pelvic pain since yesterday    FINDINGS:  LIVER:  No enlargement, atrophy, abnormal density, or significant focal lesion. Prior noted borderline hepatomegaly has resolved. BILIARY:  No visible dilatation or calcification. comfortable with d/c. Return precautions provided along with precautions of levaquin. Pt verbalized understanding. Attending agrees.                 Disposition and Plan     Clinical Impression:  Acute diverticulitis  (primary encounter diagnosis)    Dis

## 2020-02-15 ENCOUNTER — APPOINTMENT (OUTPATIENT)
Dept: ULTRASOUND IMAGING | Facility: HOSPITAL | Age: 70
DRG: 037 | End: 2020-02-15
Payer: MEDICARE

## 2020-02-15 ENCOUNTER — HOSPITAL ENCOUNTER (INPATIENT)
Facility: HOSPITAL | Age: 70
LOS: 3 days | Discharge: HOME HEALTH | DRG: 037 | End: 2020-02-19
Attending: EMERGENCY MEDICINE | Admitting: HOSPITALIST
Payer: MEDICARE

## 2020-02-15 ENCOUNTER — APPOINTMENT (OUTPATIENT)
Dept: CT IMAGING | Facility: HOSPITAL | Age: 70
DRG: 037 | End: 2020-02-15
Attending: EMERGENCY MEDICINE
Payer: MEDICARE

## 2020-02-15 ENCOUNTER — APPOINTMENT (OUTPATIENT)
Dept: ULTRASOUND IMAGING | Facility: HOSPITAL | Age: 70
DRG: 037 | End: 2020-02-15
Attending: EMERGENCY MEDICINE
Payer: MEDICARE

## 2020-02-15 DIAGNOSIS — I65.22 STENOSIS OF LEFT CAROTID ARTERY: ICD-10-CM

## 2020-02-15 DIAGNOSIS — I77.1 SUBCLAVIAN ARTERY STENOSIS (HCC): ICD-10-CM

## 2020-02-15 DIAGNOSIS — G45.8 SUBCLAVIAN STEAL SYNDROME OF LEFT SUBCLAVIAN ARTERY: ICD-10-CM

## 2020-02-15 DIAGNOSIS — R77.8 ELEVATED TROPONIN: Primary | ICD-10-CM

## 2020-02-15 DIAGNOSIS — R94.31 ABNORMAL EKG: ICD-10-CM

## 2020-02-15 PROCEDURE — 70498 CT ANGIOGRAPHY NECK: CPT | Performed by: EMERGENCY MEDICINE

## 2020-02-15 PROCEDURE — 71275 CT ANGIOGRAPHY CHEST: CPT | Performed by: EMERGENCY MEDICINE

## 2020-02-15 PROCEDURE — 93931 UPPER EXTREMITY STUDY: CPT | Performed by: EMERGENCY MEDICINE

## 2020-02-15 PROCEDURE — 99223 1ST HOSP IP/OBS HIGH 75: CPT | Performed by: INTERNAL MEDICINE

## 2020-02-15 RX ORDER — MORPHINE SULFATE 4 MG/ML
4 INJECTION, SOLUTION INTRAMUSCULAR; INTRAVENOUS ONCE
Status: COMPLETED | OUTPATIENT
Start: 2020-02-15 | End: 2020-02-15

## 2020-02-15 RX ORDER — HEPARIN SODIUM 5000 [USP'U]/ML
80 INJECTION INTRAVENOUS; SUBCUTANEOUS ONCE
Status: COMPLETED | OUTPATIENT
Start: 2020-02-15 | End: 2020-02-15

## 2020-02-15 RX ORDER — HEPARIN SODIUM AND DEXTROSE 10000; 5 [USP'U]/100ML; G/100ML
18 INJECTION INTRAVENOUS ONCE
Status: COMPLETED | OUTPATIENT
Start: 2020-02-15 | End: 2020-02-16

## 2020-02-15 RX ORDER — MORPHINE SULFATE 4 MG/ML
INJECTION, SOLUTION INTRAMUSCULAR; INTRAVENOUS
Status: DISCONTINUED
Start: 2020-02-15 | End: 2020-02-16

## 2020-02-16 ENCOUNTER — ANESTHESIA EVENT (OUTPATIENT)
Dept: CARDIAC SURGERY | Facility: HOSPITAL | Age: 70
DRG: 037 | End: 2020-02-16
Payer: MEDICARE

## 2020-02-16 ENCOUNTER — ANESTHESIA (OUTPATIENT)
Dept: CARDIAC SURGERY | Facility: HOSPITAL | Age: 70
DRG: 037 | End: 2020-02-16
Payer: MEDICARE

## 2020-02-16 ENCOUNTER — APPOINTMENT (OUTPATIENT)
Dept: GENERAL RADIOLOGY | Facility: HOSPITAL | Age: 70
DRG: 037 | End: 2020-02-16
Attending: ANESTHESIOLOGY
Payer: MEDICARE

## 2020-02-16 PROBLEM — G45.8 SUBCLAVIAN STEAL SYNDROME OF LEFT SUBCLAVIAN ARTERY: Status: ACTIVE | Noted: 2020-02-16

## 2020-02-16 PROBLEM — I65.22 STENOSIS OF LEFT CAROTID ARTERY: Status: ACTIVE | Noted: 2020-02-16

## 2020-02-16 PROBLEM — R94.31 ABNORMAL EKG: Status: ACTIVE | Noted: 2020-02-16

## 2020-02-16 PROBLEM — I77.1 SUBCLAVIAN ARTERY STENOSIS (HCC): Status: ACTIVE | Noted: 2020-02-16

## 2020-02-16 PROCEDURE — B31JYZZ FLUOROSCOPY OF LEFT UPPER EXTREMITY ARTERIES USING OTHER CONTRAST: ICD-10-PCS | Performed by: SURGERY

## 2020-02-16 PROCEDURE — 99232 SBSQ HOSP IP/OBS MODERATE 35: CPT | Performed by: INTERNAL MEDICINE

## 2020-02-16 PROCEDURE — S0028 INJECTION, FAMOTIDINE, 20 MG: HCPCS | Performed by: ANESTHESIOLOGY

## 2020-02-16 PROCEDURE — 03CA0ZZ EXTIRPATION OF MATTER FROM LEFT ULNAR ARTERY, OPEN APPROACH: ICD-10-PCS | Performed by: SURGERY

## 2020-02-16 PROCEDURE — 71045 X-RAY EXAM CHEST 1 VIEW: CPT | Performed by: ANESTHESIOLOGY

## 2020-02-16 PROCEDURE — 03743DZ DILATION OF LEFT SUBCLAVIAN ARTERY WITH INTRALUMINAL DEVICE, PERCUTANEOUS APPROACH: ICD-10-PCS | Performed by: SURGERY

## 2020-02-16 PROCEDURE — 03CC0ZZ EXTIRPATION OF MATTER FROM LEFT RADIAL ARTERY, OPEN APPROACH: ICD-10-PCS | Performed by: SURGERY

## 2020-02-16 PROCEDURE — 02HV33Z INSERTION OF INFUSION DEVICE INTO SUPERIOR VENA CAVA, PERCUTANEOUS APPROACH: ICD-10-PCS | Performed by: HOSPITALIST

## 2020-02-16 RX ORDER — HYDROCODONE BITARTRATE AND ACETAMINOPHEN 5; 325 MG/1; MG/1
1 TABLET ORAL EVERY 4 HOURS PRN
Status: DISCONTINUED | OUTPATIENT
Start: 2020-02-16 | End: 2020-02-16

## 2020-02-16 RX ORDER — ONDANSETRON 2 MG/ML
4 INJECTION INTRAMUSCULAR; INTRAVENOUS EVERY 6 HOURS PRN
Status: DISCONTINUED | OUTPATIENT
Start: 2020-02-16 | End: 2020-02-19

## 2020-02-16 RX ORDER — ONDANSETRON 2 MG/ML
INJECTION INTRAMUSCULAR; INTRAVENOUS AS NEEDED
Status: DISCONTINUED | OUTPATIENT
Start: 2020-02-16 | End: 2020-02-16 | Stop reason: SURG

## 2020-02-16 RX ORDER — SODIUM CHLORIDE 9 MG/ML
INJECTION, SOLUTION INTRAVENOUS CONTINUOUS
Status: DISCONTINUED | OUTPATIENT
Start: 2020-02-16 | End: 2020-02-16

## 2020-02-16 RX ORDER — AMLODIPINE BESYLATE 5 MG/1
5 TABLET ORAL
Status: DISCONTINUED | OUTPATIENT
Start: 2020-02-16 | End: 2020-02-19

## 2020-02-16 RX ORDER — PHENYLEPHRINE HCL 10 MG/ML
VIAL (ML) INJECTION AS NEEDED
Status: DISCONTINUED | OUTPATIENT
Start: 2020-02-16 | End: 2020-02-16 | Stop reason: SURG

## 2020-02-16 RX ORDER — MORPHINE SULFATE 4 MG/ML
2 INJECTION, SOLUTION INTRAMUSCULAR; INTRAVENOUS EVERY 2 HOUR PRN
Status: DISCONTINUED | OUTPATIENT
Start: 2020-02-16 | End: 2020-02-19

## 2020-02-16 RX ORDER — HYDRALAZINE HYDROCHLORIDE 50 MG/1
50 TABLET, FILM COATED ORAL 2 TIMES DAILY
Status: DISCONTINUED | OUTPATIENT
Start: 2020-02-16 | End: 2020-02-19

## 2020-02-16 RX ORDER — FAMOTIDINE 10 MG/ML
INJECTION, SOLUTION INTRAVENOUS AS NEEDED
Status: DISCONTINUED | OUTPATIENT
Start: 2020-02-16 | End: 2020-02-16 | Stop reason: SURG

## 2020-02-16 RX ORDER — FUROSEMIDE 20 MG/1
20 TABLET ORAL DAILY
Status: DISCONTINUED | OUTPATIENT
Start: 2020-02-16 | End: 2020-02-19

## 2020-02-16 RX ORDER — ROCURONIUM BROMIDE 10 MG/ML
INJECTION, SOLUTION INTRAVENOUS AS NEEDED
Status: DISCONTINUED | OUTPATIENT
Start: 2020-02-16 | End: 2020-02-16 | Stop reason: SURG

## 2020-02-16 RX ORDER — NALOXONE HYDROCHLORIDE 0.4 MG/ML
80 INJECTION, SOLUTION INTRAMUSCULAR; INTRAVENOUS; SUBCUTANEOUS AS NEEDED
Status: DISCONTINUED | OUTPATIENT
Start: 2020-02-16 | End: 2020-02-16

## 2020-02-16 RX ORDER — CLOPIDOGREL BISULFATE 75 MG/1
300 TABLET ORAL ONCE
Status: COMPLETED | OUTPATIENT
Start: 2020-02-16 | End: 2020-02-16

## 2020-02-16 RX ORDER — ACETAMINOPHEN 10 MG/ML
1000 INJECTION, SOLUTION INTRAVENOUS EVERY 6 HOURS PRN
Status: DISCONTINUED | OUTPATIENT
Start: 2020-02-16 | End: 2020-02-17

## 2020-02-16 RX ORDER — MIDAZOLAM HYDROCHLORIDE 1 MG/ML
INJECTION INTRAMUSCULAR; INTRAVENOUS AS NEEDED
Status: DISCONTINUED | OUTPATIENT
Start: 2020-02-16 | End: 2020-02-16 | Stop reason: SURG

## 2020-02-16 RX ORDER — ONDANSETRON 2 MG/ML
4 INJECTION INTRAMUSCULAR; INTRAVENOUS AS NEEDED
Status: DISCONTINUED | OUTPATIENT
Start: 2020-02-16 | End: 2020-02-16

## 2020-02-16 RX ORDER — SODIUM CHLORIDE, SODIUM LACTATE, POTASSIUM CHLORIDE, CALCIUM CHLORIDE 600; 310; 30; 20 MG/100ML; MG/100ML; MG/100ML; MG/100ML
INJECTION, SOLUTION INTRAVENOUS CONTINUOUS
Status: DISCONTINUED | OUTPATIENT
Start: 2020-02-16 | End: 2020-02-17

## 2020-02-16 RX ORDER — ATORVASTATIN CALCIUM 80 MG/1
80 TABLET, FILM COATED ORAL NIGHTLY
Status: DISCONTINUED | OUTPATIENT
Start: 2020-02-16 | End: 2020-02-19

## 2020-02-16 RX ORDER — HYDROCODONE BITARTRATE AND ACETAMINOPHEN 5; 325 MG/1; MG/1
2 TABLET ORAL EVERY 6 HOURS PRN
Status: DISCONTINUED | OUTPATIENT
Start: 2020-02-17 | End: 2020-02-17

## 2020-02-16 RX ORDER — MORPHINE SULFATE 4 MG/ML
4 INJECTION, SOLUTION INTRAMUSCULAR; INTRAVENOUS EVERY 2 HOUR PRN
Status: DISCONTINUED | OUTPATIENT
Start: 2020-02-16 | End: 2020-02-16

## 2020-02-16 RX ORDER — NITROGLYCERIN 20 MG/100ML
INJECTION INTRAVENOUS CONTINUOUS PRN
Status: DISCONTINUED | OUTPATIENT
Start: 2020-02-16 | End: 2020-02-16 | Stop reason: SURG

## 2020-02-16 RX ORDER — ASPIRIN 81 MG/1
81 TABLET ORAL DAILY
Status: DISCONTINUED | OUTPATIENT
Start: 2020-02-16 | End: 2020-02-18

## 2020-02-16 RX ORDER — HYDROCODONE BITARTRATE AND ACETAMINOPHEN 5; 325 MG/1; MG/1
2 TABLET ORAL EVERY 4 HOURS PRN
Status: DISCONTINUED | OUTPATIENT
Start: 2020-02-16 | End: 2020-02-16

## 2020-02-16 RX ORDER — DULOXETIN HYDROCHLORIDE 30 MG/1
30 CAPSULE, DELAYED RELEASE ORAL DAILY
Status: DISCONTINUED | OUTPATIENT
Start: 2020-02-16 | End: 2020-02-19

## 2020-02-16 RX ORDER — POTASSIUM CHLORIDE 14.9 MG/ML
20 INJECTION INTRAVENOUS ONCE
Status: COMPLETED | OUTPATIENT
Start: 2020-02-16 | End: 2020-02-16

## 2020-02-16 RX ORDER — HEPARIN SODIUM 5000 [USP'U]/ML
INJECTION, SOLUTION INTRAVENOUS; SUBCUTANEOUS AS NEEDED
Status: DISCONTINUED | OUTPATIENT
Start: 2020-02-16 | End: 2020-02-16 | Stop reason: SURG

## 2020-02-16 RX ORDER — DULOXETIN HYDROCHLORIDE 30 MG/1
60 CAPSULE, DELAYED RELEASE ORAL
Status: DISCONTINUED | OUTPATIENT
Start: 2020-02-16 | End: 2020-02-19

## 2020-02-16 RX ORDER — CLOPIDOGREL BISULFATE 75 MG/1
75 TABLET ORAL DAILY
Status: DISCONTINUED | OUTPATIENT
Start: 2020-02-17 | End: 2020-02-19

## 2020-02-16 RX ORDER — LEVOTHYROXINE SODIUM 0.1 MG/1
100 TABLET ORAL
Status: DISCONTINUED | OUTPATIENT
Start: 2020-02-16 | End: 2020-02-16 | Stop reason: DRUGHIGH

## 2020-02-16 RX ORDER — HEPARIN SODIUM AND DEXTROSE 10000; 5 [USP'U]/100ML; G/100ML
INJECTION INTRAVENOUS CONTINUOUS
Status: DISCONTINUED | OUTPATIENT
Start: 2020-02-16 | End: 2020-02-16

## 2020-02-16 RX ORDER — HYDROCODONE BITARTRATE AND ACETAMINOPHEN 5; 325 MG/1; MG/1
1 TABLET ORAL EVERY 6 HOURS PRN
Status: DISCONTINUED | OUTPATIENT
Start: 2020-02-17 | End: 2020-02-17

## 2020-02-16 RX ORDER — DEXTROSE MONOHYDRATE 25 G/50ML
50 INJECTION, SOLUTION INTRAVENOUS
Status: DISCONTINUED | OUTPATIENT
Start: 2020-02-16 | End: 2020-02-19

## 2020-02-16 RX ORDER — ACETAMINOPHEN 325 MG/1
650 TABLET ORAL EVERY 4 HOURS PRN
Status: DISCONTINUED | OUTPATIENT
Start: 2020-02-16 | End: 2020-02-16

## 2020-02-16 RX ORDER — DIPHENHYDRAMINE HYDROCHLORIDE 50 MG/ML
INJECTION INTRAMUSCULAR; INTRAVENOUS AS NEEDED
Status: DISCONTINUED | OUTPATIENT
Start: 2020-02-16 | End: 2020-02-16 | Stop reason: SURG

## 2020-02-16 RX ORDER — LISINOPRIL 20 MG/1
20 TABLET ORAL DAILY
Status: DISCONTINUED | OUTPATIENT
Start: 2020-02-16 | End: 2020-02-19

## 2020-02-16 RX ORDER — FENOFIBRATE 134 MG/1
134 CAPSULE ORAL
Status: DISCONTINUED | OUTPATIENT
Start: 2020-02-16 | End: 2020-02-19

## 2020-02-16 RX ORDER — EZETIMIBE 10 MG/1
10 TABLET ORAL NIGHTLY
Status: DISCONTINUED | OUTPATIENT
Start: 2020-02-16 | End: 2020-02-19

## 2020-02-16 RX ORDER — HYDROMORPHONE HYDROCHLORIDE 1 MG/ML
0.4 INJECTION, SOLUTION INTRAMUSCULAR; INTRAVENOUS; SUBCUTANEOUS EVERY 5 MIN PRN
Status: DISCONTINUED | OUTPATIENT
Start: 2020-02-16 | End: 2020-02-16

## 2020-02-16 RX ORDER — HEPARIN SODIUM AND DEXTROSE 10000; 5 [USP'U]/100ML; G/100ML
INJECTION INTRAVENOUS CONTINUOUS
Status: DISCONTINUED | OUTPATIENT
Start: 2020-02-16 | End: 2020-02-19

## 2020-02-16 RX ORDER — MORPHINE SULFATE 4 MG/ML
1 INJECTION, SOLUTION INTRAMUSCULAR; INTRAVENOUS EVERY 2 HOUR PRN
Status: DISCONTINUED | OUTPATIENT
Start: 2020-02-16 | End: 2020-02-19

## 2020-02-16 RX ORDER — ACETAMINOPHEN 10 MG/ML
1000 INJECTION, SOLUTION INTRAVENOUS EVERY 6 HOURS PRN
Status: DISCONTINUED | OUTPATIENT
Start: 2020-02-16 | End: 2020-02-16

## 2020-02-16 RX ORDER — METOCLOPRAMIDE HYDROCHLORIDE 5 MG/ML
10 INJECTION INTRAMUSCULAR; INTRAVENOUS AS NEEDED
Status: DISCONTINUED | OUTPATIENT
Start: 2020-02-16 | End: 2020-02-16

## 2020-02-16 RX ORDER — CLINDAMYCIN PHOSPHATE 900 MG/50ML
900 INJECTION INTRAVENOUS EVERY 8 HOURS
Status: COMPLETED | OUTPATIENT
Start: 2020-02-16 | End: 2020-02-17

## 2020-02-16 RX ORDER — CLINDAMYCIN PHOSPHATE 900 MG/50ML
INJECTION INTRAVENOUS AS NEEDED
Status: DISCONTINUED | OUTPATIENT
Start: 2020-02-16 | End: 2020-02-16 | Stop reason: SURG

## 2020-02-16 RX ADMIN — HEPARIN SODIUM 1000 UNITS: 5000 INJECTION, SOLUTION INTRAVENOUS; SUBCUTANEOUS at 11:00:00

## 2020-02-16 RX ADMIN — PHENYLEPHRINE HCL 100 MCG: 10 MG/ML VIAL (ML) INJECTION at 08:19:00

## 2020-02-16 RX ADMIN — PHENYLEPHRINE HCL 100 MCG: 10 MG/ML VIAL (ML) INJECTION at 08:07:00

## 2020-02-16 RX ADMIN — HEPARIN SODIUM 1000 UNITS: 5000 INJECTION, SOLUTION INTRAVENOUS; SUBCUTANEOUS at 10:09:00

## 2020-02-16 RX ADMIN — ROCURONIUM BROMIDE 30 MG: 10 INJECTION, SOLUTION INTRAVENOUS at 09:05:00

## 2020-02-16 RX ADMIN — DIPHENHYDRAMINE HYDROCHLORIDE 25 MG: 50 INJECTION INTRAMUSCULAR; INTRAVENOUS at 09:30:00

## 2020-02-16 RX ADMIN — SODIUM CHLORIDE: 9 INJECTION, SOLUTION INTRAVENOUS at 07:48:00

## 2020-02-16 RX ADMIN — FAMOTIDINE 20 MG: 10 INJECTION, SOLUTION INTRAVENOUS at 09:30:00

## 2020-02-16 RX ADMIN — MIDAZOLAM HYDROCHLORIDE 2 MG: 1 INJECTION INTRAMUSCULAR; INTRAVENOUS at 07:50:00

## 2020-02-16 RX ADMIN — NITROGLYCERIN 50 MCG/MIN: 20 INJECTION INTRAVENOUS at 12:07:00

## 2020-02-16 RX ADMIN — PHENYLEPHRINE HCL 100 MCG: 10 MG/ML VIAL (ML) INJECTION at 09:32:00

## 2020-02-16 RX ADMIN — SODIUM CHLORIDE: 9 INJECTION, SOLUTION INTRAVENOUS at 11:58:00

## 2020-02-16 RX ADMIN — PHENYLEPHRINE HCL 100 MCG: 10 MG/ML VIAL (ML) INJECTION at 08:39:00

## 2020-02-16 RX ADMIN — PHENYLEPHRINE HCL 100 MCG: 10 MG/ML VIAL (ML) INJECTION at 11:26:00

## 2020-02-16 RX ADMIN — ROCURONIUM BROMIDE 50 MG: 10 INJECTION, SOLUTION INTRAVENOUS at 07:53:00

## 2020-02-16 RX ADMIN — ONDANSETRON 4 MG: 2 INJECTION INTRAMUSCULAR; INTRAVENOUS at 10:30:00

## 2020-02-16 RX ADMIN — PHENYLEPHRINE HCL 100 MCG: 10 MG/ML VIAL (ML) INJECTION at 08:44:00

## 2020-02-16 RX ADMIN — HEPARIN SODIUM 8000 UNITS: 5000 INJECTION, SOLUTION INTRAVENOUS; SUBCUTANEOUS at 09:02:00

## 2020-02-16 RX ADMIN — SODIUM CHLORIDE: 9 INJECTION, SOLUTION INTRAVENOUS at 10:43:00

## 2020-02-16 RX ADMIN — HEPARIN SODIUM 2000 UNITS: 5000 INJECTION, SOLUTION INTRAVENOUS; SUBCUTANEOUS at 09:41:00

## 2020-02-16 RX ADMIN — PHENYLEPHRINE HCL 100 MCG: 10 MG/ML VIAL (ML) INJECTION at 08:49:00

## 2020-02-16 RX ADMIN — HEPARIN SODIUM 4000 UNITS: 5000 INJECTION, SOLUTION INTRAVENOUS; SUBCUTANEOUS at 09:16:00

## 2020-02-16 RX ADMIN — CLINDAMYCIN PHOSPHATE 900 MG: 900 INJECTION INTRAVENOUS at 08:55:00

## 2020-02-16 RX ADMIN — PHENYLEPHRINE HCL 100 MCG: 10 MG/ML VIAL (ML) INJECTION at 08:31:00

## 2020-02-16 RX ADMIN — PHENYLEPHRINE HCL 150 MCG: 10 MG/ML VIAL (ML) INJECTION at 08:11:00

## 2020-02-16 RX ADMIN — PHENYLEPHRINE HCL 100 MCG: 10 MG/ML VIAL (ML) INJECTION at 09:25:00

## 2020-02-16 RX ADMIN — PHENYLEPHRINE HCL 100 MCG: 10 MG/ML VIAL (ML) INJECTION at 10:27:00

## 2020-02-16 NOTE — PLAN OF CARE
Pt admitted from he ER around 0230. AOx4, denies chest pain or dyspnea. Room air. SR on tele. Up with SBA. Heparin gtt infusing. IVF started. Morphine IV given for left forearm pain.   Pt states that her arm feels better since the Heparin has been st

## 2020-02-16 NOTE — PROGRESS NOTES
[facility] [place] [facility]OSPITAL[facility]  Progress Note     Meenu Jenkins Patient Status:  Inpatient    12/15/1950 MRN [id number]   [facility] 6NE-A Attending Buffy Almaguer MD   Hosp Day # 0 PCP Jyotsna Price     Chief Complaint: post op     S: Patient drowsy Lab 02/15/20  1954 02/16/20  1257   PTP 12.1* 15.7*   INR 0.87* 1.19*       Recent Labs   Lab 02/15/20  1954   TROP 0.086*            Imaging: Imaging data reviewed in Epic.     Medications:   • Insulin Aspart Pen  1-10 Units Subcutaneous TID AC and HS MD          **Certification      PHYSICIAN Certification of Need for Inpatient Hospitalization - Initial Certification    Patient will require inpatient services that will reasonably be expected to span two midnight's based on the clinical documentation in

## 2020-02-16 NOTE — ED INITIAL ASSESSMENT (HPI)
Patient to ER with complaint of pain to L arm, L shoulder, states \"I can't even make a fist.\" L hand noted to be cold, weak L radial pulse.

## 2020-02-16 NOTE — PROGRESS NOTES
Vascular Surgery Note    Full consult note to follow. Pt presents to the ED with chest discomfort, left arm pain with numbness and weakness for 3 days duration. She has previous hx of CABG in 08/2019 with LIMA to LAD.  She underwent CTA that revealed a high

## 2020-02-16 NOTE — ANESTHESIA PREPROCEDURE EVALUATION
PRE-OP EVALUATION    Patient Name: eLela Swanson    Pre-op Diagnosis:  Thrombus left arm    Procedure(s):  THROMBECTOMY LEFT ARM WITH ANGIOGRAM AND SUBCLAVIAN STENT    Surgeon(s) and Role:     * Trisha Cobb MD - Primary     * Cely Green MD - A Daily  [MAR Hold] atorvastatin (LIPITOR) tab 80 mg, 80 mg, Oral, Nightly  [MAR Hold] DULoxetine HCl (CYMBALTA) DR particles cap 30 mg, 30 mg, Oral, Daily  [MAR Hold] DULoxetine HCl (CYMBALTA) DR particles cap 60 mg, 60 mg, Oral, Daily  [MAR Hold] ezetimibe CAPSULE BY MOUTH EVERY DAY WITH 60MG, Disp: , Rfl: 1  furosemide 20 MG Oral Tab, Take 1 tablet (20 mg total) by mouth daily. , Disp: 90 tablet, Rfl: 3  atorvastatin 80 MG Oral Tab, TAKE 1 TABLET BY MOUTH EVERY EVENING, Disp: 90 tablet, Rfl: 3  ezetimibe 10 Shellfish; Welchol [Colesevelam Hydrochloride]; Colesevelam      Anesthesia Evaluation    Patient summary reviewed.     Anesthetic Complications  (+) history of anesthetic complications         GI/Hepatic/Renal             (+) chronic renal disease and CRI Airway      Mallampati: III  Mouth opening: 3 FB  TM distance: 4 - 6 cm  Neck ROM: limited Cardiovascular      Rhythm: regular  Rate: normal     Dental    No notable dental history.          Pulmonary    Pulmonary exam normal.                 Other

## 2020-02-16 NOTE — ED PROVIDER NOTES
Patient Seen in: [facility] Emergency Department      History   Patient presents with:  Pain    Stated Complaint: pain to L upper back, L shoulder, X days , DENIES CHEST PAIN    HPI    50-year-old female presents emergency department with a complain use  Abdomen: Soft nontender nondistended, no rebound no guarding  no hepatosplenomegaly bowel sounds are present , no pulsatile mass  Extremities: Patient's left arm is cold to touch. There is no radial pulse noted.   Cap refill is extremely sluggish and Rhythm  Reading: Patient has flipped T waves in the inferior lateral leads              Patient was observed in the emergency department and would be concerned about a arterial clot versus a dissection.   Patient be given some IV pain medication and be sent Patient otherwise appears fine she did have an elevated troponin and the T waves were flat. Cardiology comfortable with patient going to CTU. Vascular surgery states they will most likely stent her tomorrow.   Patient comfortable and will be kept on a hep

## 2020-02-16 NOTE — ANESTHESIA PROCEDURE NOTES
Airway  Date/Time: 2/16/2020 7:55 AM  Urgency: elective      General Information and Staff    Patient location during procedure: OR  Anesthesiologist: Padmini Davalos MD  Performed: anesthesiologist     Indications and Patient Condition  Indications f

## 2020-02-16 NOTE — PROGRESS NOTES
Received pt as she was being taken to surgery. Blood sugar 27, but was taken in left hand. Repeat blood sugar 62 in right hand. Surgical RN notified.

## 2020-02-16 NOTE — H&P
[facility] HOSPITALIST                                                               History & Physical         Yvon Kapoor Patient Status:  Emergency    12/15/1950 MRN [id number]   Location [address] [facility] Attending Lissette Scott MD ABDOM HYSTERECTOMY         Family history:  Family History   Problem Relation Age of Onset   • Heart Disorder Father    • Diabetes Father    • Heart Attack Father    • Heart Disorder Mother    • Heart Attack Mother    • Heart Disorder Brother    • Diabetes erythema. Psychiatric: Appropriate mood and affect.       Diagnostic Data:      Laboratory Data:   Lab Results   Component Value Date    WBC 9.1 02/15/2020    HGB 12.0 02/15/2020    HCT 34.8 02/15/2020    .0 02/15/2020    CREATSERUM 1.81 02/15/2020 at 23:12      CT ANGIOGRAPHY, CHEST (CPT=71275)     COMPARISON:  None.      INDICATIONS:  pain to L upper back, L shoulder, X days , DENIES CHEST PAIN     TECHNIQUE:  IV contrast-enhanced multislice CT angiography is performed through the pulmonary arterial angiography of the neck vasculature from AP window to Sella was performed using nonionic contrast. 3D volume rendering images were obtained by the technologist as well as the radiologist on an independent   workstation.  Multiplanar 3D reformatted imaging i There is a large calcified and noncalcified plaque involving the origin of the left subclavian artery with critical stenosis. Distal to the plaque , there is normal flow within the left subclavian artery.   COMMON CAROTID:    No hemodynamically significant hydralazine  2. Follow blood pressure  4. Hypothyroidism  1. Continue levothyroxine  5. Hyperlipidemia  1. Continue fenofibrate and statin and Zetia  6. Diabetes mellitus type 2  1. Hyperglycemia protocol  2.  Follow Accu-Chek      Quality:  · DVT Prophylax

## 2020-02-16 NOTE — PLAN OF CARE
Problem: METABOLIC/FLUID AND ELECTROLYTES - ADULT  Goal: Glucose maintained within prescribed range  Description  INTERVENTIONS:  - Monitor Blood Glucose as ordered  - Assess for signs and symptoms of hyperglycemia and hypoglycemia  - Administer ordered marked per vascular surgery per doppler. MD Monk at bedside. Left hand warm. Valencia hugger applied per surgery request. Currently warm blankets applied. Friend at bedside. Vss. See assessment for complete details. Will continue to monitor.

## 2020-02-16 NOTE — ANESTHESIA POSTPROCEDURE EVALUATION
[address] Patient Status:  Inpatient   Age/Gender 78year old female MRN [id number]   [facility] 6NE-A Attending Jd Deluna MD   Hosp Day # 0 PCP EDUARDA DURAN       Anesthesia Post-op Note    Procedure(s):  THROMB

## 2020-02-16 NOTE — CONSULTS
Cardiology Consult Note      SUBJECTIVE:    Reason for Consultation: left arm shoulder pain    History of Present Illness: Patient is 78year old female  Who follows with me with DM had cab x 3 in August who presents with cold painful left hand, couldn't d Oral Tab, Sig Take 1 tablet (50 mg total) by mouth 2 (two) times daily. , Start Date 10/18/19, End Date , Taking? Yes, Authorizing Provider Jackeline Patton MD    Medication DULoxetine HCl 60 MG Oral Cap DR Particles, Sig Take 60 mg by mouth once daily. Reported    Medication Insulin Aspart Pen (NOVOLOG FLEXPEN) 100 UNIT/ML Subcutaneous Solution Pen-injector, Sig Inject 50 Units into the skin 3 (three) times daily before meals. , Start Date , End Date , Taking?  Yes, Authorizing Provider External/Patient, 81 mg, Oral, Daily  atorvastatin (LIPITOR) tab 80 mg, 80 mg, Oral, Nightly  DULoxetine HCl (CYMBALTA) DR particles cap 30 mg, 30 mg, Oral, Daily  DULoxetine HCl (CYMBALTA) DR particles cap 60 mg, 60 mg, Oral, Daily  ezetimibe (ZETIA) tab 10 mg, 10 mg, Oral Relation Age of Onset   • Heart Disorder Father    • Diabetes Father    • Heart Attack Father    • Heart Disorder Mother    • Heart Attack Mother    • Heart Disorder Brother    • Diabetes Brother    • Heart Attack Maternal Grandmother    • Heart Attack Mat normal; no masses,  no organomegaly  Extremities: no edema, redness or tenderness in the calves or thighs  Pulses: decreased left arm  Skin: Skin color, texture, turgor normal. No rashes or lesions  Neurologic: Grossly normal      Diagnostics:  ECG: sr ? A Unremarkable. No stenosis or dissection. RIGHT  SUBCLAVIAN ARTERY:    No hemodynamically significant stenosis or dissection. COMMON CAROTID:    No hemodynamically significant stenosis or dissection.   CAROTID BULB:    No hemodynam quality: fair.  -The left ventricle is normal in size. There is mild concentric left ventricular hypertrophy.  Left ventricular systolic function is normal. EF = 67% (2D biplane) Left ventricular diastolic function is consistent with abnormal relaxation (st subclavian artery    ashd hx cab x 3 zaman lad svgs OM1 PDA  Acute thrombosis left brachial atery  aso carotid on left and subclavian  Dm  Hl  htn    PLAN:    Home meds  Vascular procedure  Elevated troponin demand not acs

## 2020-02-16 NOTE — BRIEF OP NOTE
Sagar Huynh  [id number]  [facility]: [id number]    Pre-Operative Diagnosis:   1. Subclavian steal syndrome  2. Vertebrobasilar insufficiency  3. Coronary Artery Disease  4. [place] IIA ischemia of the LUE. 5. Diabetes  6.  Obesity     Post-Operative Diagnos

## 2020-02-17 PROCEDURE — 99232 SBSQ HOSP IP/OBS MODERATE 35: CPT | Performed by: INTERNAL MEDICINE

## 2020-02-17 PROCEDURE — 05H533Z INSERTION OF INFUSION DEVICE INTO RIGHT SUBCLAVIAN VEIN, PERCUTANEOUS APPROACH: ICD-10-PCS | Performed by: HOSPITALIST

## 2020-02-17 RX ORDER — SODIUM CHLORIDE 0.9 % (FLUSH) 0.9 %
10 SYRINGE (ML) INJECTION EVERY 12 HOURS
Status: DISCONTINUED | OUTPATIENT
Start: 2020-02-17 | End: 2020-02-19

## 2020-02-17 RX ORDER — HYDROCODONE BITARTRATE AND ACETAMINOPHEN 10; 325 MG/1; MG/1
1 TABLET ORAL EVERY 6 HOURS PRN
Status: DISCONTINUED | OUTPATIENT
Start: 2020-02-17 | End: 2020-02-18

## 2020-02-17 RX ORDER — HYDROCODONE BITARTRATE AND ACETAMINOPHEN 10; 325 MG/1; MG/1
2 TABLET ORAL EVERY 6 HOURS PRN
Status: DISCONTINUED | OUTPATIENT
Start: 2020-02-17 | End: 2020-02-18

## 2020-02-17 NOTE — PLAN OF CARE
Assumed care of pt 1930. A+Ox4. Pain managed with PRN meds. NSR. HR 60s. Generalized edema non pitting. LUE edema non pitting. R radial pulse, bilat pedal pulses +2. L palmar arch pulse doppler. LUE ace wrap C/D/I.  Heparin infusing @500 units/hr, LR infusi

## 2020-02-17 NOTE — PROGRESS NOTES
Cards    FU: Subclavian stenosis. Treated by vascular. Doing well. No issues. No cardiac complaints.     HYDROcodone-acetaminophen (NORCO)  MG per tab 1 tablet, 1 tablet, Oral, Q6H PRN    Or  HYDROcodone-acetaminophen (NORCO)  MG per t CONTINUOUS, 500 Units/hr, Intravenous, Continuous  acetaminophen (OFIRMEV) infusion 1,000 mg, 1,000 mg, Intravenous, Q6H PRN       02/17/20  0900   BP: 134/60   Pulse: 62   Resp: 14   Temp:      Physical Exam:    General: NAD  Neck: No JVD  Lungs: CTA bila

## 2020-02-17 NOTE — PLAN OF CARE
Assumed care of the pt at 0730, Dr. Lindsey Yanez to bedside, checked all pulses with MD, left ace wrap in place until tomorrow. PT/PTT results gone over with MD, told to leave Hep gtt at 500u/hr until tomorrow, no further blood tests needs for that gtt.  Up to surrounding tissue  - Implement wound care per orders  - Initiate isolation precautions as appropriate  - Initiate Pressure Ulcer prevention bundle as indicated  Outcome: Progressing     Problem: PAIN - ADULT  Goal: Verbalizes/displays adequate comfort lev etc)  - Arrange for interpreters to assist at discharge as needed  - Consider post-discharge preferences of patient/family/discharge partner  - Complete POLST form as appropriate  - Assess patient's ability to be responsible for managing their own health

## 2020-02-17 NOTE — PROGRESS NOTES
Vascular Surgery Progress Note    Patient seen and examined this AM.  No overnight events. Patient endorses discomfort in the left upper extremity by her incisions and with the hand swelling.   Her hand feels warm and she denies any numbness, tingling, wea

## 2020-02-17 NOTE — PROGRESS NOTES
[facility] PSYCHIATRIC [place] HOSPITALIST  Progress Note     Artemio Coffman Patient Status:  Inpatient    12/15/1950 MRN [id number]   [facility] 6NE-A Attending Clayton Ellis MD   Hosp Day # 1 PCP Felicitas Osler     Chief Complaint: left arm pain    S: Patient o 24  --  43  --    BILT 0.3  --  0.4  --    TP 7.1  --  6.1*  --        Estimated Creatinine Clearance: 32.6 mL/min (A) (based on SCr of 1.27 mg/dL (H)).     Recent Labs   Lab 02/15/20  1954 02/16/20  1257   PTP 12.1* 15.7*   INR 0.87* 1.19*       Recent Lab course  At this point Ms. Grayson is expected to be discharge to: home?   Plan of care discussed with pt and RN.     Cosme Kennedy MD

## 2020-02-18 PROCEDURE — 99232 SBSQ HOSP IP/OBS MODERATE 35: CPT | Performed by: INTERNAL MEDICINE

## 2020-02-18 RX ORDER — HYDROCODONE BITARTRATE AND ACETAMINOPHEN 10; 325 MG/1; MG/1
2 TABLET ORAL EVERY 6 HOURS PRN
Status: DISCONTINUED | OUTPATIENT
Start: 2020-02-18 | End: 2020-02-19

## 2020-02-18 RX ORDER — HEPARIN SODIUM 5000 [USP'U]/ML
30 INJECTION, SOLUTION INTRAVENOUS; SUBCUTANEOUS ONCE
Status: COMPLETED | OUTPATIENT
Start: 2020-02-18 | End: 2020-02-18

## 2020-02-18 RX ORDER — HYDROCODONE BITARTRATE AND ACETAMINOPHEN 10; 325 MG/1; MG/1
1 TABLET ORAL EVERY 4 HOURS PRN
Status: DISCONTINUED | OUTPATIENT
Start: 2020-02-18 | End: 2020-02-19

## 2020-02-18 NOTE — PHYSICAL THERAPY NOTE
PHYSICAL THERAPY QUICK EVALUATION - INPATIENT    Room Number: 8743/3332-P  Evaluation Date: 2/18/2020  Presenting Problem: L subclavian steal syndrome s/p  angiogram and stenting 2/17/20  Physician Order: PT Eval and Treat    Problem List  Principal Prob recent falls. In regards to her typical activity, pt reports, \"I'm a lady of leisure. \"    SUBJECTIVE  Pt's self stated goal is to decrease L UE edema and pain.       OBJECTIVE  Precautions: None  Fall Risk: Standard fall risk    WEIGHT BEARING RESTRICTIO increased risk for falls]  1. Gait level surface: 3  Grading: Raul the lowest category that applies. (3)  Normal: Walks 20’, no assistive devices, good speed, no evidence of imbalance, normal gait pattern.   (2)  Mild Impairment: Walks 20’, uses assistive that applies. (3) Normal: Preforms head turns smoothly with no change in gait. (2) Mild Impairment: Preforms head turns smoothly with slight change in gait velocity, i.e., minor disruption to smooth gait path or uses walking aid.   (1) Moderate Impairment rehab aide will communicate with overseeing PT regarding any change in functional mobility. RN aware. GOALS  Patient was able to achieve the following goals . ..     Patient was able to transfer Safely and independently   Patient able to ambulate on le

## 2020-02-18 NOTE — PROGRESS NOTES
Cards    FU: Subclavian stenosis. Treated by vascular. Doing well. No issues. No cardiac complaints.     HYDROcodone-acetaminophen (NORCO)  MG per tab 1 tablet, 1 tablet, Oral, Q4H PRN    Or  HYDROcodone-acetaminophen (NORCO)  MG per t [facility]) injection 4 mg, 4 mg, Intravenous, Q6H PRN  heparin (PORCINE) drip [id number]/250mL infusion CONTINUOUS, 200-3,000 Units/hr, Intravenous, Continuous       02/18/20  1400   BP: 137/59   Pulse: 72   Resp: 18   Temp: 98 °F (36.7 °C)     Physical Exam

## 2020-02-18 NOTE — DIETARY NOTE
[address]     Admitting diagnosis:  Abnormal EKG [R94.31]  Elevated troponin [R79.89]  Stenosis of left carotid artery [I65.22]  Subclavian artery stenosis (HCC) [I77.1]  Subclavian steal syndrome of left subc patient status changes or nutrition issues arise.     Tigist Cohen RD,LDN  Pager #[phone] [place] [phone]

## 2020-02-18 NOTE — PROGRESS NOTES
[facility] PSYCHIATRIC [place] HOSPITALIST  Progress Note     Leela Swanson Patient Status:  Inpatient    12/15/1950 MRN [id number]   [facility] 6NE-A Attending Sera Hamilton MD   Hosp Day # 2 PCP Jameson Foster     Chief Complaint: LUE pain    S: Patient really ALKPHO 77  --  64  --   --    AST 22  --  102*  --   --    ALT 24  --  43  --   --    BILT 0.3  --  0.4  --   --    TP 7.1  --  6.1*  --   --     < > = values in this interval not displayed.        Estimated Creatinine Clearance: 28.8 mL/min (A) (based on protocol  2. Follow Accu-Check  3.  Will increase levemir today to 12 BID        Quality:  · DVT Prophylaxis: Heparin   · CODE status: full  · [place] none    Will the patient be referred to [facility] on discharge?: TBD  Estimated date of discharge: TBD  Discharge

## 2020-02-18 NOTE — PLAN OF CARE
Assumed care of pt 1930. A+Ox4, neuro intact. Pain managed with PRN meds. NSR. HR 60s. Generalized edema non pitting. LUE edema non pitting. R radial pulse, bilat pedal pulses +2. L palmar arch pulse doppler.  LUE ace wrap C/D/I, MD to remove in AM. Heparin

## 2020-02-18 NOTE — PROGRESS NOTES
Vascular Surgery Progress Note    Patient seen and examined this AM.  No overnight events. Patient endorses  Some pain in the left upper extremity by her incisions. Her hand feels warm and she denies any numbness, tingling, weakness.  Her mobility in the h

## 2020-02-19 VITALS
WEIGHT: 199.81 LBS | DIASTOLIC BLOOD PRESSURE: 46 MMHG | RESPIRATION RATE: 20 BRPM | OXYGEN SATURATION: 97 % | HEART RATE: 67 BPM | TEMPERATURE: 98 F | SYSTOLIC BLOOD PRESSURE: 119 MMHG | BODY MASS INDEX: 37 KG/M2

## 2020-02-19 PROCEDURE — 99239 HOSP IP/OBS DSCHRG MGMT >30: CPT | Performed by: HOSPITALIST

## 2020-02-19 RX ORDER — HYDROCODONE BITARTRATE AND ACETAMINOPHEN 10; 325 MG/1; MG/1
1 TABLET ORAL EVERY 4 HOURS PRN
Qty: 20 TABLET | Refills: 0 | Status: SHIPPED | OUTPATIENT
Start: 2020-02-19

## 2020-02-19 RX ORDER — HEPARIN SODIUM 5000 [USP'U]/ML
30 INJECTION, SOLUTION INTRAVENOUS; SUBCUTANEOUS ONCE
Status: COMPLETED | OUTPATIENT
Start: 2020-02-19 | End: 2020-02-19

## 2020-02-19 RX ORDER — CLOPIDOGREL BISULFATE 75 MG/1
75 TABLET ORAL DAILY
Qty: 90 TABLET | Refills: 3 | Status: SHIPPED | OUTPATIENT
Start: 2020-02-20 | End: 2021-05-10

## 2020-02-19 NOTE — PLAN OF CARE
Received this am declined pain medication. Looking forward to going home friend will stay with her @ her house. Dr. Michael Deras here removed dressing and OT Tubigrip dressing applied discharge instructions given.   notified discharge planning for

## 2020-02-19 NOTE — PROGRESS NOTES
Vascular Surgery Progress Note    Patient seen and examined this AM.  No overnight events. Pain in the left upper extremity by her incisions is much improved. Her hand feels warm and she denies any numbness, tingling, weakness.  Her mobility in the hand an

## 2020-02-19 NOTE — PLAN OF CARE
Assumed care of pt 1930. A+Ox4, neuro intact. Pain managed with PRN meds. NSR. HR 60-70s. Generalized edema non pitting. LUE edema non pitting, elevated on pillows. R radial pulse, bilat pedal pulses +2. L palmar arch pulse doppler. LUE ace wrap C/D/I.  [employer]

## 2020-02-19 NOTE — PROGRESS NOTES
[facility] HOSPITALIST  Progress Note     Norma Goodell Patient Status:  Inpatient    12/15/1950 MRN [id number]   [facility] 6NE-A Attending Fiordaliza Lovelace MD   Hosp Day # 3 PCP EDUARDA DURAN     Chief Complaint: LUE pain    S: Patient feels 3.9   < > 3.8 4.1 4.3 4.0      < > 110 111 105 105   CO2 23.0   < > 21.0 22.0 25.0 27.0   ALKPHO 77  --  64  --   --   --    AST 22  --  102*  --   --   --    ALT 24  --  43  --   --   --    BILT 0.3  --  0.4  --   --   --    TP 7.1  --  6.1*  --   - mellitus type 2   1. Cont. Insulin protocol    Dispo: as above. Ok to DC. Need to transition to [address] prior to DC.   SW to look into cost.  Transfer out of CNICU        Quality:  · DVT Prophylaxis: Heparin   · CODE status: full  · [place] none    Will the pa

## 2020-02-19 NOTE — PLAN OF CARE
Pt progresses well. ACE bandage removed by Dr. Negro Gray this am. Incisions sites look god, pulses to left hand good. LUE re-wrapped per MD instruction. LUE to remain elevated above heart level whenever possile.  PT/OT eval, pt given left hand exercises to d
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
## 2020-02-19 NOTE — HOME CARE LIAISON
Received referral from [facility]. Met with patient at the bedside to discuss home health services and offer choice. Patient is agreeable to [facility] services at discharge. Brochure and contact information provided. Any questions addressed. Will follow.

## 2020-02-19 NOTE — CM/SW NOTE
MD orders received for [facility] for VN/ incision care. KATIE spoke with pt who is in agreement with HHC/VN. SW provided agency choices, from which pt agreeable to referral to [facility]. [facility] liaison paged with referral. Anticipate discharge today.  /case ma

## 2020-02-20 NOTE — DISCHARGE SUMMARY
[facility] HOSPITALIST  DISCHARGE SUMMARY     Miryam Santos Patient Status:  Inpatient    12/15/1950 MRN [id number]   [facility] 6NE-A Attending No att. providers found   Hosp Day # 3 PCP Aryan Moya     Date of Admission: 2/15/2020  Date o Medium Risk  0-28   Low Risk         TCM Follow-Up Recommendation:  LACE > 58:  High Risk of readmission after discharge from the hospital.      Procedures during hospitalization:   • Angio and stent    Consultants:  • Vascular surgery  • cardiology    Disc known as:  APRESOLINE      Take 1 tablet (50 mg total) by mouth 2 (two) times daily. Quantity:  180 tablet  Refills:  3     Insulin NPH (Human) (Isophane) 100 UNIT/ML Susp      Inject 25 Units into the skin nightly.    Refills:  0     Levothyroxine Sodium 25 MG Tabs         [place]    Follow-up appointment:   Sepideh Mendosa MD  [address] [place] [address]  [phone]    Schedule an appointment as soon as possible for a visit in 2 weeks  For suture removal can shower use

## 2020-02-20 NOTE — OPERATIVE REPORT
[facility]    PATIENT'S NAME: Carolina Bustos   ATTENDING PHYSICIAN: Roosevelt Alaniz M.D. OPERATING PHYSICIAN: Honey Lopez.  MD Aleshia   PATIENT ACCOUNT#:   [id number]    LOCATION:  [address] [place] [address]  MEDICAL RECORD #:   [id number]       DATE OF BIRTH 3-day history of severe worsening of the left arm pain with numbness and tingling as well as weakness. She also endorsed chest pain and has also had several-month history of drop attacks.   She underwent imaging that revealed a high-grade subclavian stenos A Trailblazer catheter and 0.014 Gladius wire were used to traverse the entirety of the subclavian, and we were able to traverse the high-grade stenosis and then select the descending thoracic aorta.   We then performed balloon dilation with a 4 mm balloon placed proximally and distally until negative pass was obtained with removal of significant thrombus. The artery was then closed transversely with 6-0 Prolene suture in interrupted fashion. This was done in the ulnar artery as well.   Upon completion of t

## 2020-03-05 PROBLEM — E78.5 HYPERLIPIDEMIA: Status: ACTIVE | Noted: 2020-03-05

## 2020-03-05 PROBLEM — R16.1 SPLENOMEGALY: Status: ACTIVE | Noted: 2020-01-28

## 2020-03-05 PROBLEM — I25.10 CORONARY ARTERY DISEASE INVOLVING NATIVE CORONARY ARTERY OF NATIVE HEART WITHOUT ANGINA PECTORIS: Status: ACTIVE | Noted: 2019-08-19

## 2020-03-05 PROBLEM — F41.9 ANXIETY: Status: ACTIVE | Noted: 2020-03-05

## 2020-03-05 PROBLEM — D64.9 ANEMIA: Status: ACTIVE | Noted: 2019-08-27

## 2020-03-05 PROBLEM — D72.829 LEUKOCYTOSIS: Status: ACTIVE | Noted: 2019-09-02

## 2020-03-05 PROBLEM — I10 BENIGN ESSENTIAL HYPERTENSION: Status: ACTIVE | Noted: 2020-03-05

## 2020-03-05 PROBLEM — R07.9 CHEST PAIN: Status: ACTIVE | Noted: 2019-11-04

## 2020-03-05 PROBLEM — R10.13 EPIGASTRIC PAIN: Status: ACTIVE | Noted: 2019-04-01

## 2020-03-05 PROBLEM — M25.511 RIGHT SHOULDER PAIN: Status: ACTIVE | Noted: 2019-04-01

## 2020-03-05 PROBLEM — M19.90 ARTHRITIS: Status: ACTIVE | Noted: 2020-03-05

## 2020-03-05 PROBLEM — I25.10 ATHEROSCLEROSIS OF CORONARY ARTERY: Status: ACTIVE | Noted: 2019-08-26

## 2020-03-05 PROBLEM — S06.5X9A SUBDURAL HEMATOMA (HCC): Status: ACTIVE | Noted: 2019-08-29

## 2020-03-05 PROBLEM — K63.5 COLON POLYP: Status: ACTIVE | Noted: 2019-06-07

## 2020-03-05 PROBLEM — Z12.39 BREAST CANCER SCREENING: Status: ACTIVE | Noted: 2019-06-09

## 2021-01-26 DIAGNOSIS — Z23 NEED FOR VACCINATION: ICD-10-CM

## 2021-11-03 ENCOUNTER — APPOINTMENT (OUTPATIENT)
Dept: GENERAL RADIOLOGY | Facility: HOSPITAL | Age: 71
End: 2021-11-03
Attending: EMERGENCY MEDICINE
Payer: MEDICARE

## 2021-11-03 ENCOUNTER — HOSPITAL ENCOUNTER (EMERGENCY)
Facility: HOSPITAL | Age: 71
Discharge: HOME | End: 2021-11-03
Attending: EMERGENCY MEDICINE
Payer: MEDICARE

## 2021-11-03 VITALS
RESPIRATION RATE: 20 BRPM | HEIGHT: 63 IN | WEIGHT: 219 LBS | DIASTOLIC BLOOD PRESSURE: 77 MMHG | HEART RATE: 67 BPM | BODY MASS INDEX: 38.8 KG/M2 | OXYGEN SATURATION: 99 % | SYSTOLIC BLOOD PRESSURE: 172 MMHG | TEMPERATURE: 98 F

## 2021-11-03 DIAGNOSIS — M17.12 OSTEOARTHRITIS OF LEFT KNEE, UNSPECIFIED OSTEOARTHRITIS TYPE: Primary | ICD-10-CM

## 2021-11-03 PROCEDURE — 99283 EMERGENCY DEPT VISIT LOW MDM: CPT

## 2021-11-03 PROCEDURE — 73562 X-RAY EXAM OF KNEE 3: CPT | Performed by: EMERGENCY MEDICINE

## 2021-11-03 PROCEDURE — 99284 EMERGENCY DEPT VISIT MOD MDM: CPT

## 2021-11-03 RX ORDER — HYDROCODONE BITARTRATE AND ACETAMINOPHEN 5; 325 MG/1; MG/1
1-2 TABLET ORAL EVERY 4 HOURS PRN
Qty: 20 TABLET | Refills: 0 | Status: SHIPPED | OUTPATIENT
Start: 2021-11-03

## 2021-11-03 NOTE — ED INITIAL ASSESSMENT (HPI)
PT TO ED WITH C/O LEFT KNEE PAIN X 2 MONTHS, DENIES INJURY OR TRAUMA, PAIN ON WEIGHT BEARING.  NO SWELLING OR DEFORMITY NOTED

## 2021-11-03 NOTE — ED PROVIDER NOTES
Patient Seen in: [facility] Emergency Department      History   Patient presents with:  Pain: left leg pain, no injury    Stated Complaint: left leg pain x 2 month, no injury    Subjective:   HPI    34-year-old female comes to the hospital complaint injury  Other systems are as noted in HPI. Constitutional and vital signs reviewed. All other systems reviewed and negative except as noted above.     Physical Exam     ED Triage Vitals [11/03/21 1359]   BP (!) 172/77   Pulse 70   Resp 22   Temp 97.8 emergency medical condition was not or was no longer present. There was no indication for further evaluation, treatment or admission on an emergency basis. The usual and customary discharge instuctions were discussed given the patient's ER course.
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
## (undated) DEVICE — STANDARD HYPODERMIC NEEDLE,POLYPROPYLENE HUB: Brand: MONOJECT

## (undated) DEVICE — KENDALL SCD EXPRESS SLEEVES, KNEE LENGTH, MEDIUM: Brand: KENDALL SCD

## (undated) DEVICE — INTENDED TO BE USED TO OCCLUDE, RETRACT AND IDENTIFY ARTERIES, VEINS, TENDONS AND NERVES IN SURGICAL PROCEDURES: Brand: STERION®  VESSEL LOOP

## (undated) DEVICE — SKIN AFFIX .4ML

## (undated) DEVICE — FLOSEAL HEMOSTATIC MATRIX, 5ML: Brand: FLOSEAL HEMOSTATIC MATRIX

## (undated) DEVICE — CHLORAPREP 26ML APPLICATOR

## (undated) DEVICE — NON-ADHERENT PAD PREPACK: Brand: TELFA

## (undated) DEVICE — SUTURE MONOCRYL 4-0 PS-2

## (undated) DEVICE — CV PACK-LF: Brand: MEDLINE INDUSTRIES, INC.

## (undated) DEVICE — GEL AQUASONIC 100 20GR

## (undated) DEVICE — HEMOCLIP MED 24 CLIP/CARTRIDGE

## (undated) DEVICE — TRANSPOSAL ULTRAFLEX DUO/QUAD ULTRA CART MANIFOLD

## (undated) DEVICE — FOGARTY ARTERIAL EMBOLECTOMY CATHETER 2F 60CM: Brand: FOGARTY

## (undated) DEVICE — GAUZE SPONGES,12 PLY: Brand: CURITY

## (undated) DEVICE — SYRINGE 20CC LL TIP

## (undated) DEVICE — DRAPE,EXTREMITY,89X128,STERILE: Brand: MEDLINE

## (undated) DEVICE — SOL  .9 1000ML BTL

## (undated) DEVICE — SUTURE VICRYL 3-0 SH

## (undated) DEVICE — HEMOCLIP HORIZON SM MULTI

## (undated) DEVICE — FOGARTY ARTERIAL EMBOLECTOMY CATHETER 3F 40CM: Brand: FOGARTY

## (undated) DEVICE — SUTURE PROLENE 5-0 C-1

## (undated) DEVICE — SUTURE PROLENE 6-0 C-1

## (undated) DEVICE — PROXIMATE RH ROTATING HEAD SKIN STAPLERS (35 WIDE) CONTAINS 35 STAINLESS STEEL STAPLES: Brand: PROXIMATE

## (undated) DEVICE — SUTURE ETHILON 3-0 PS-2

## (undated) DEVICE — DECANTER BAG 9": Brand: MEDLINE INDUSTRIES, INC.

## (undated) DEVICE — SURGICAL POWDER 3.0G

## (undated) DEVICE — BASIC DOUBLE BASIN 1-LF: Brand: MEDLINE INDUSTRIES, INC.

## (undated) DEVICE — 3M™ TEGADERM™ TRANSPARENT FILM DRESSING, 1626W, 4 IN X 4-3/4 IN (10 CM X 12 CM), 50 EACH/CARTON, 4 CARTON/CASE: Brand: 3M™ TEGADERM™

## (undated) DEVICE — SUTURE PROLENE 7-0 CC

## (undated) DEVICE — SYRINGE 10ML LL TIP

## (undated) NOTE — LETTER
Mimi Franco [address] [facility]    Consent for Operation  Date:  2/16/2020          Time:      1.  I authorize the performance upon Maureen Judd the following operation:  Procedure(s):            Left upper extremity thromb procedure has been videotaped, the surgeon will obtain the original videotape. The hospital will not be responsible for storage or maintenance of this tape.   6. For the purpose of advancing medical education, I consent to the admittance of observers to the STATEMENTS REQUIRING INSERTION OR COMPLETION WERE FILLED IN.     Signature of Patient:   ___________________________    When the patient is a minor or mentally incompetent to give consent:  Signature of person authorized to consent for patient: ____________ supplements, and pills I can buy without a prescription (including street drugs/illegal medications). Failure to inform my anesthesiologist about these medicines may increase my risk of anesthetic complications. iv.  If I am allergic to anything or have ha Anesthesiologist Signature     Date   Time  I have discussed the procedure and information above with the patient (or patient’s representative) and answered their questions. The patient or their representative has agreed to have anesthesia services.     ___

## (undated) NOTE — ED AVS SNAPSHOT
[facility] Emergency Department    [place]  [address] [address]    Phone:  [phone]    Fax:  [phone] [address] .   MRN: [id number]    Department:  [facility] Emergency Department   Date of Visit:  3/2 Discharge References/Attachments     DIARRHEA, UNKNOWN CAUSE (ENGLISH)    DEHYDRATION (ADULT) (ENGLISH)      Disclosure     Insurance plans vary and the physician(s) referred by the ER may not be covered by your plan.  Please contact your insurance company If you have been prescribed any medication(s), please fill your prescription right away and begin taking the medication(s) as directed    If the emergency physician has read X-rays, these will be re-interpreted by a radiologist.  If there is a significant can help with your Affordable Care Act coverage, as well as all types of Medicaid plans. To get signed up and covered, please call [phone] and ask to get set up for an insurance coverage that is in-network with [facility].         Shannan ABDOMINAL WALL:  Normal.  No mass or hernia. URINARY BLADDER:  Very mild bladder wall thickening is noted. PELVIC NODES:  Normal.  No adenopathy. PELVIC ORGANS:  Status post hysterectomy. BONES:  Normal.  No bony lesion or fracture.     LUNG BASES

## (undated) NOTE — ED AVS SNAPSHOT
Mrs. Artemio Coffman   MRN: [id number]    Department:  [facility] Emergency Department   Date of Visit:  1/23/2020           Disclosure     Insurance plans vary and the physician(s) referred by the ER may not be covered by your plan.  Please conta tell this physician (or your personal doctor if your instructions are to return to your personal doctor) about any new or lasting problems. The primary care or specialist physician will see patients referred from the [facility] Emergency Department.  Lauryn Delgado

## (undated) NOTE — ED AVS SNAPSHOT
[facility] Emergency Department    [place]  [address] [address]    Phone:  [phone]    Fax:  [phone] [phone] .   MRN: [id number]    Department:  [facility] Emergency Department   Date of Visit:  3/2 IF THERE IS ANY CHANGE OR WORSENING OF YOUR CONDITION, CALL YOUR PRIMARY CARE PHYSICIAN AT ONCE OR RETURN IMMEDIATELY TO THE EMERGENCY DEPARTMENT.     If you have been prescribed any medication(s), please fill your prescription right away and begin taking t